# Patient Record
Sex: FEMALE | Race: WHITE | NOT HISPANIC OR LATINO | Employment: FULL TIME | URBAN - METROPOLITAN AREA
[De-identification: names, ages, dates, MRNs, and addresses within clinical notes are randomized per-mention and may not be internally consistent; named-entity substitution may affect disease eponyms.]

---

## 2017-02-05 ENCOUNTER — OFFICE VISIT (OUTPATIENT)
Dept: RETAIL CLINIC | Facility: CLINIC | Age: 22
End: 2017-02-05

## 2017-02-05 DIAGNOSIS — Z02.83 ENCOUNTER FOR DRUG SCREENING: Primary | ICD-10-CM

## 2017-02-05 PROBLEM — Z02.1 DRUG SCREENING, PRE-EMPLOYMENT: Status: ACTIVE | Noted: 2017-02-05

## 2017-11-13 ENCOUNTER — OFFICE VISIT (OUTPATIENT)
Dept: GASTROENTEROLOGY | Facility: CLINIC | Age: 22
End: 2017-11-13

## 2017-11-13 VITALS
BODY MASS INDEX: 39.38 KG/M2 | SYSTOLIC BLOOD PRESSURE: 110 MMHG | WEIGHT: 214 LBS | DIASTOLIC BLOOD PRESSURE: 80 MMHG | HEIGHT: 62 IN

## 2017-11-13 DIAGNOSIS — K21.9 GASTROESOPHAGEAL REFLUX DISEASE, ESOPHAGITIS PRESENCE NOT SPECIFIED: ICD-10-CM

## 2017-11-13 DIAGNOSIS — K58.2 IRRITABLE BOWEL SYNDROME WITH BOTH CONSTIPATION AND DIARRHEA: Primary | ICD-10-CM

## 2017-11-13 DIAGNOSIS — R11.0 NAUSEA: ICD-10-CM

## 2017-11-13 PROCEDURE — 99204 OFFICE O/P NEW MOD 45 MIN: CPT | Performed by: INTERNAL MEDICINE

## 2017-11-13 RX ORDER — VENLAFAXINE 37.5 MG/1
75 TABLET ORAL DAILY
COMMUNITY
Start: 2017-11-07 | End: 2018-11-07

## 2017-11-13 RX ORDER — SUMATRIPTAN 100 MG/1
100 TABLET, FILM COATED ORAL
COMMUNITY
Start: 2017-11-07 | End: 2018-11-07

## 2017-11-13 RX ORDER — OMEPRAZOLE 40 MG/1
40 CAPSULE, DELAYED RELEASE ORAL DAILY
COMMUNITY
End: 2019-09-17

## 2017-11-13 RX ORDER — DICYCLOMINE HYDROCHLORIDE 10 MG/1
10 CAPSULE ORAL EVERY 6 HOURS PRN
Qty: 90 CAPSULE | Refills: 1 | Status: SHIPPED | OUTPATIENT
Start: 2017-11-13 | End: 2018-10-03 | Stop reason: ALTCHOICE

## 2017-11-13 RX ORDER — RANITIDINE 150 MG/1
150 CAPSULE ORAL DAILY
COMMUNITY
End: 2019-10-09

## 2017-11-13 RX ORDER — ONDANSETRON 4 MG/1
4 TABLET, FILM COATED ORAL EVERY 12 HOURS PRN
Qty: 15 TABLET | Refills: 1 | Status: SHIPPED | OUTPATIENT
Start: 2017-11-13 | End: 2018-01-25 | Stop reason: SDUPTHER

## 2017-11-13 NOTE — PATIENT INSTRUCTIONS
Start the bentyl as needed for diarrhea.    Start citrucel tablets (or generic-- 500mg) -- 2 tablets with breakfast and dinner with 8 oz water    Zofran as needed for severe nausea    Take prilosec before breakfast and 300mg zantac at bedtime    For any additional questions, concerns or changes to your condition after today's office visit please contact the office at [phone].

## 2017-11-13 NOTE — PROGRESS NOTES
"Chief Complaint   Patient presents with   • Irritable Bowel Syndrome     gastritis, acid reflux dx at last scope 2014       Subjective     HPI    Dior Curry is a 22 y.o. female with a past medical history noted below who presents for evaluation of IBS, gastritis, and acid reflux.  Symptoms have been present for the past 7 years.     Her IBS symptoms include cycling symptoms of diarrhea, followed by more formed stools, followed by constipation. Symptoms associated with abdominal cramping. Diarrhea worse with stress. Diarrhea consists of about 4-5 urgent loose stools.  No nocturnal stools, there is no blood in her stool.  Constipation consists of hard and difficult to pass stools that take a long time to pass.  She has been on bentyl in the past and found that it helped.  She cycles between her symptoms on a weekly basis.    She gets a lot of acid reflux associated with nausea.  She feels \"a fireball\" in her stomach and will have acidic belches.  She takes omeprazole and zantac daily she takes at the same time every morning.  She feels that these medicines take the edge off but don't completely relieve her symptoms.    The nausea is a fairly consistent symptom for her.  There is no associated vomiting with it.  She says that it makes her not want to eat though she has had significant weight gain despite her symptoms.    December 2014 EGD notable for retained food-- records from [facility] Dr Iain Thrasher reviewed. She was told to eat 5 small meals daily.  He did not have any gastric emptying testing.  She did have both EGD and colonoscopy at [facility] prior to that and was diagnosed with gastritis.      She has gained weight over the few years-- she thinks about 50# since 2014.    She avoids NSAIDs.  Social ETOH, no smoking.  She works at [employer].    Mother with ulcerative colitis.  No GI malignancies in her family.      October 9, 2017 labs reviewed from [facility].  She had a normal hemoglobin, " white count and platelets.  She had normal kidney function and liver chemistry.      Past Medical History:   Diagnosis Date   • Chronic headaches    • Depression    • Gastritis    • GERD (gastroesophageal reflux disease)    • IBS (irritable bowel syndrome)    • PCOS (polycystic ovarian syndrome)          Current Outpatient Prescriptions:   •  omeprazole (priLOSEC) 40 MG capsule, Take 40 mg by mouth., Disp: , Rfl:   •  ranitidine (ZANTAC) 150 MG capsule, Take 150 mg by mouth., Disp: , Rfl:   •  SUMAtriptan (IMITREX) 100 MG tablet, Take 100 mg by mouth., Disp: , Rfl:   •  venlafaxine (EFFEXOR) 37.5 MG tablet, Take 75 mg by mouth., Disp: , Rfl:   •  dicyclomine (BENTYL) 10 MG capsule, Take 1 capsule by mouth Every 6 (Six) Hours As Needed (diarrhea)., Disp: 90 capsule, Rfl: 1  •  ondansetron (ZOFRAN) 4 MG tablet, Take 1 tablet by mouth Every 12 (Twelve) Hours As Needed for Nausea or Vomiting., Disp: 15 tablet, Rfl: 1    No Known Allergies    Social History     Social History   • Marital status: Unknown     Spouse name: N/A   • Number of children: N/A   • Years of education: N/A     Occupational History   • Not on file.     Social History Main Topics   • Smoking status: Never Smoker   • Smokeless tobacco: Not on file   • Alcohol use Yes      Comment: very seldom   • Drug use: Not on file   • Sexual activity: Not on file     Other Topics Concern   • Not on file     Social History Narrative   • No narrative on file       Family History   Problem Relation Age of Onset   • Ulcerative colitis Mother        Review of Systems   Constitutional: Positive for unexpected weight change. Negative for activity change, appetite change and fatigue.   HENT: Negative for sore throat and trouble swallowing.    Respiratory: Negative.    Cardiovascular: Negative.    Gastrointestinal: Positive for constipation, diarrhea and nausea. Negative for abdominal distention, abdominal pain and blood in stool.   Endocrine: Negative for cold intolerance  and heat intolerance.   Genitourinary: Negative for difficulty urinating, dysuria and frequency.   Musculoskeletal: Negative for arthralgias, back pain and myalgias.   Skin: Negative.    Hematological: Negative for adenopathy. Does not bruise/bleed easily.   All other systems reviewed and are negative.      Objective     Vitals:    11/13/17 1102   BP: 110/80     Last 2 weights    11/13/17  1102   Weight: 214 lb (97.1 kg)     Body mass index is 39.14 kg/(m^2).    Physical Exam   Constitutional: She is oriented to person, place, and time. She appears well-developed and well-nourished. No distress.   obese   HENT:   Head: Normocephalic and atraumatic.   Right Ear: External ear normal.   Left Ear: External ear normal.   Nose: Nose normal.   Mouth/Throat: Oropharynx is clear and moist.   Eyes: Conjunctivae and EOM are normal. Right eye exhibits no discharge. Left eye exhibits no discharge. No scleral icterus.   Neck: Normal range of motion. Neck supple. No thyromegaly present.   No supraclavicular adenopathy   Cardiovascular: Normal rate, regular rhythm, normal heart sounds and intact distal pulses.  Exam reveals no gallop.    No murmur heard.  No lower extremity edema   Pulmonary/Chest: Effort normal and breath sounds normal. No respiratory distress. She has no wheezes.   Abdominal: Soft. Normal appearance and bowel sounds are normal. She exhibits no distension and no mass. There is no hepatosplenomegaly. There is tenderness. There is no rigidity, no rebound and no guarding. No hernia.   Bilateral LQ TTP   Genitourinary:   Genitourinary Comments: Rectal exam deferred   Musculoskeletal: Normal range of motion. She exhibits no edema or tenderness.   No atrophy of upper or lower extremities.  Normal digits and nails of both hands.   Lymphadenopathy:     She has no cervical adenopathy.   Neurological: She is alert and oriented to person, place, and time. She displays no atrophy. Coordination normal.   Skin: Skin is warm and  dry. No rash noted. She is not diaphoretic. No erythema.   Psychiatric: She has a normal mood and affect. Her behavior is normal. Judgment and thought content normal.   Vitals reviewed.      No results found for: WBC, RBC, HGB, HCT, MCV, MCH, MCHC, RDW, RDWSD, MPV, PLT, NEUTRORELPCT, LYMPHORELPCT, MONORELPCT, EOSRELPCT, BASORELPCT, AUTOIGPER, NEUTROABS, LYMPHSABS, MONOSABS, EOSABS, BASOSABS, AUTOIGNUM, NRBC    No results found for: GLUCOSE, NA, K, CO2, CL, ANIONGAP, CREATININE, BUN, BCR, CALCIUM, EGFRIFNONA, ALKPHOS, PROTEINTOT, ALT, AST, BILITOT, ALBUMIN, GLOB, LABIL2      Imaging Results (last 7 days)     ** No results found for the last 168 hours. **            No notes on file    Assessment/Plan    1. IBS: Long-standing issue with alternating diarrhea and constipation.  She has had relief with Bentyl in the past.    2. Nausea: EGD 3 years ago notable for some retained solid food in her stomach.?  Gastroparesis versus dyspepsia    3. GERD: ?  Due to gastroparesis versus functional dyspepsia    Plan  -dicyclomine to use for diarrhea  -to start citrucel fiber supplementation  -omeprazole before breakfast, zantac at night  -zofran as needed for severe  Nausea  -if no improvement, will check GES  -consider repeating EGD    Doir was seen today for irritable bowel syndrome.    Diagnoses and all orders for this visit:    Irritable bowel syndrome with both constipation and diarrhea  -     dicyclomine (BENTYL) 10 MG capsule; Take 1 capsule by mouth Every 6 (Six) Hours As Needed (diarrhea).    Gastroesophageal reflux disease, esophagitis presence not specified    Nausea  -     ondansetron (ZOFRAN) 4 MG tablet; Take 1 tablet by mouth Every 12 (Twelve) Hours As Needed for Nausea or Vomiting.      I have discussed the above plan with the patient.  They verbalize understanding and are in agreement with the plan.  They have been advised to contact the office for any questions, concerns, or changes related to their  health.    Dictated utilizing Dragon dictation

## 2018-01-25 DIAGNOSIS — R11.0 NAUSEA: ICD-10-CM

## 2018-01-26 RX ORDER — ONDANSETRON 4 MG/1
TABLET, FILM COATED ORAL
Qty: 15 TABLET | Refills: 0 | Status: SHIPPED | OUTPATIENT
Start: 2018-01-26 | End: 2018-04-08 | Stop reason: SDUPTHER

## 2018-04-04 ENCOUNTER — OFFICE VISIT (OUTPATIENT)
Dept: GASTROENTEROLOGY | Facility: CLINIC | Age: 23
End: 2018-04-04

## 2018-04-04 VITALS
BODY MASS INDEX: 38.72 KG/M2 | SYSTOLIC BLOOD PRESSURE: 116 MMHG | HEIGHT: 62 IN | WEIGHT: 210.4 LBS | DIASTOLIC BLOOD PRESSURE: 68 MMHG | TEMPERATURE: 98.4 F

## 2018-04-04 DIAGNOSIS — K21.9 GASTROESOPHAGEAL REFLUX DISEASE, ESOPHAGITIS PRESENCE NOT SPECIFIED: ICD-10-CM

## 2018-04-04 DIAGNOSIS — K58.2 IRRITABLE BOWEL SYNDROME WITH BOTH CONSTIPATION AND DIARRHEA: Primary | ICD-10-CM

## 2018-04-04 PROCEDURE — 99214 OFFICE O/P EST MOD 30 MIN: CPT | Performed by: INTERNAL MEDICINE

## 2018-04-04 RX ORDER — DROSPIRENONE AND ETHINYL ESTRADIOL 0.03MG-3MG
1 KIT ORAL DAILY
COMMUNITY
Start: 2017-12-18 | End: 2019-09-17 | Stop reason: ALTCHOICE

## 2018-04-04 NOTE — PROGRESS NOTES
Chief Complaint   Patient presents with   • Irritable Bowel Syndrome     Subjective     HPI  Dior Curry is a 23 y.o. female who presents for follow up of irritable bowel with both diarrhea and constipation.  Her predominant baseline was diarrhea.  However, for the past month, she has been mostly constipated.  Having BMs about once per day.  Having urge to have a BM but only a small amount will come out and she will spend up to an hour on the toilet trying to pass stool.  She will occasionally have to digitally disimpact. She has tried miralax and and OTC pill laxatives without much relief.  Did have some diarrhea about 2 days ago but feels this was related to something she ate.  She has not needed to take the dicyclomine.    She is taking omeprazole in the morning and zantac at night with relief of GERD symptoms. No weight loss.  Otherwise she is feeling well.    Past Medical History:   Diagnosis Date   • Chronic headaches    • Depression    • Gastritis    • GERD (gastroesophageal reflux disease)    • IBS (irritable bowel syndrome)    • PCOS (polycystic ovarian syndrome)        Social History     Social History   • Marital status: Unknown     Social History Main Topics   • Smoking status: Never Smoker   • Smokeless tobacco: Never Used   • Alcohol use Yes      Comment: very seldom   • Drug use: No     Other Topics Concern   • Not on file         Current Outpatient Prescriptions:   •  dicyclomine (BENTYL) 10 MG capsule, Take 1 capsule by mouth Every 6 (Six) Hours As Needed (diarrhea)., Disp: 90 capsule, Rfl: 1  •  drospirenone-ethinyl estradiol (AVNI,OCELLA) 3-0.03 MG per tablet, Take 1 tablet by mouth., Disp: , Rfl:   •  omeprazole (priLOSEC) 40 MG capsule, Take 40 mg by mouth., Disp: , Rfl:   •  ondansetron (ZOFRAN) 4 MG tablet, TAKE ONE TABLET BY MOUTH EVERY 12 HOURS AS NEEDED FOR NAUSEA OR VOMITING, Disp: 15 tablet, Rfl: 0  •  ranitidine (ZANTAC) 150 MG capsule, Take 150 mg by mouth., Disp: , Rfl:   •   SUMAtriptan (IMITREX) 100 MG tablet, Take 100 mg by mouth., Disp: , Rfl:   •  venlafaxine (EFFEXOR) 37.5 MG tablet, Take 75 mg by mouth., Disp: , Rfl:   •  linaclotide (LINZESS) 145 MCG capsule capsule, Take 1 capsule by mouth Every Morning Before Breakfast., Disp: 30 capsule, Rfl: 1    Review of Systems   Constitutional: Negative for activity change, appetite change, chills and fever.   HENT: Negative for trouble swallowing.    Respiratory: Negative.    Cardiovascular: Negative.  Negative for chest pain.   Gastrointestinal: Positive for constipation. Negative for abdominal distention, abdominal pain, anal bleeding, diarrhea, nausea and vomiting.   Genitourinary: Negative for dysuria, frequency and hematuria.       Objective   Vitals:    04/04/18 1439   BP: 116/68   Temp: 98.4 °F (36.9 °C)     1    04/04/18  1439   Weight: 95.4 kg (210 lb 6.4 oz)     Body mass index is 38.48 kg/m².      Physical Exam   Constitutional: She is oriented to person, place, and time. She appears well-developed and well-nourished. No distress.   HENT:   Head: Normocephalic and atraumatic.   Right Ear: External ear normal.   Left Ear: External ear normal.   Nose: Nose normal.   Mouth/Throat: Oropharynx is clear and moist.   Eyes: Conjunctivae and EOM are normal. Right eye exhibits no discharge. Left eye exhibits no discharge. No scleral icterus.   Neck: Normal range of motion. Neck supple. No thyromegaly present.   No supraclavicular adenopathy   Cardiovascular: Normal rate, regular rhythm, normal heart sounds and intact distal pulses.  Exam reveals no gallop.    No murmur heard.  No lower extremity edema   Pulmonary/Chest: Effort normal and breath sounds normal. No respiratory distress. She has no wheezes.   Abdominal: Soft. Normal appearance and bowel sounds are normal. She exhibits no distension and no mass. There is no hepatosplenomegaly. There is no tenderness. There is no rigidity, no rebound and no guarding. No hernia.    Genitourinary:   Genitourinary Comments: Rectal exam deferred   Musculoskeletal: Normal range of motion. She exhibits no edema or tenderness.   No atrophy of upper or lower extremities.  Normal digits and nails of both hands.   Lymphadenopathy:     She has no cervical adenopathy.   Neurological: She is alert and oriented to person, place, and time. She displays no atrophy. Coordination normal.   Skin: Skin is warm and dry. No rash noted. She is not diaphoretic. No erythema.   Psychiatric: She has a normal mood and affect. Her behavior is normal. Judgment and thought content normal.   Vitals reviewed.      No results found for: WBC, RBC, HGB, HCT, MCV, MCH, MCHC, RDW, RDWSD, MPV, PLT, NEUTRORELPCT, LYMPHORELPCT, MONORELPCT, EOSRELPCT, BASORELPCT, AUTOIGPER, NEUTROABS, LYMPHSABS, MONOSABS, EOSABS, BASOSABS, AUTOIGNUM, NRBC    No results found for: GLUCOSE, BUN, CREATININE, EGFRIFNONA, EGFRIFAFRI, BCR, CO2, CALCIUM, PROTENTOTREF, ALBUMIN, LABIL2, AST, ALT      Imaging Results (last 7 days)     ** No results found for the last 168 hours. **            Assessment/Plan    1. IBS with constipation and diarrhea: now more issues with constipation    2. GERD: stable on ppi and H2 blocker    Plan  -start linzess--discussed that dose may need to be adjusted  -continue ppi and H2 blocker for now  -would like to get her symptoms stabilized  -no indication for endoscopy at this time  -needs weight loss-- needs weight under 200#    Dior was seen today for irritable bowel syndrome.    Diagnoses and all orders for this visit:    Irritable bowel syndrome with both constipation and diarrhea  -     linaclotide (LINZESS) 145 MCG capsule capsule; Take 1 capsule by mouth Every Morning Before Breakfast.    Gastroesophageal reflux disease, esophagitis presence not specified        Dictated utilizing Dragon dictation

## 2018-04-04 NOTE — PATIENT INSTRUCTIONS
Start the linzess for constipation.  We can adjust the dose as needed    Continue the fiber supplementation    For any additional questions, concerns or changes to your condition after today's office visit please contact the office at [phone].

## 2018-04-05 ENCOUNTER — DOCUMENTATION (OUTPATIENT)
Dept: GASTROENTEROLOGY | Facility: CLINIC | Age: 23
End: 2018-04-05

## 2018-04-08 DIAGNOSIS — R11.0 NAUSEA: ICD-10-CM

## 2018-04-10 RX ORDER — ONDANSETRON 4 MG/1
TABLET, FILM COATED ORAL
Qty: 15 TABLET | Refills: 0 | Status: SHIPPED | OUTPATIENT
Start: 2018-04-10 | End: 2018-05-25 | Stop reason: SDUPTHER

## 2018-05-25 DIAGNOSIS — R11.0 NAUSEA: ICD-10-CM

## 2018-05-25 RX ORDER — ONDANSETRON 4 MG/1
TABLET, FILM COATED ORAL
Qty: 15 TABLET | Refills: 0 | Status: SHIPPED | OUTPATIENT
Start: 2018-05-25 | End: 2019-09-17

## 2018-07-09 DIAGNOSIS — R11.0 NAUSEA: ICD-10-CM

## 2018-07-10 RX ORDER — ONDANSETRON 4 MG/1
TABLET, FILM COATED ORAL
Qty: 15 TABLET | Refills: 0 | Status: SHIPPED | OUTPATIENT
Start: 2018-07-10 | End: 2018-08-13 | Stop reason: SDUPTHER

## 2018-07-11 NOTE — TELEPHONE ENCOUNTER
Refill ×1.  If she persists with needing the Zofran will need further evaluation as to why she has persistent nausea

## 2018-08-13 DIAGNOSIS — R11.0 NAUSEA: ICD-10-CM

## 2018-08-17 RX ORDER — ONDANSETRON 4 MG/1
TABLET, FILM COATED ORAL
Qty: 15 TABLET | Refills: 0 | Status: SHIPPED | OUTPATIENT
Start: 2018-08-17 | End: 2019-09-17

## 2018-10-03 ENCOUNTER — OFFICE VISIT (OUTPATIENT)
Dept: GASTROENTEROLOGY | Facility: CLINIC | Age: 23
End: 2018-10-03

## 2018-10-03 VITALS
TEMPERATURE: 98.4 F | DIASTOLIC BLOOD PRESSURE: 72 MMHG | SYSTOLIC BLOOD PRESSURE: 124 MMHG | WEIGHT: 218.4 LBS | BODY MASS INDEX: 40.19 KG/M2 | HEIGHT: 62 IN

## 2018-10-03 DIAGNOSIS — Z83.79 FAMILY HISTORY OF ULCERATIVE COLITIS: ICD-10-CM

## 2018-10-03 DIAGNOSIS — K58.2 IRRITABLE BOWEL SYNDROME WITH BOTH CONSTIPATION AND DIARRHEA: Primary | ICD-10-CM

## 2018-10-03 DIAGNOSIS — R10.9 ABDOMINAL CRAMPING: ICD-10-CM

## 2018-10-03 PROCEDURE — 99214 OFFICE O/P EST MOD 30 MIN: CPT | Performed by: INTERNAL MEDICINE

## 2018-10-03 RX ORDER — AMITRIPTYLINE HYDROCHLORIDE 10 MG/1
10 TABLET, FILM COATED ORAL 2 TIMES DAILY
COMMUNITY
End: 2019-09-17 | Stop reason: ALTCHOICE

## 2018-10-03 RX ORDER — SODIUM CHLORIDE, SODIUM LACTATE, POTASSIUM CHLORIDE, CALCIUM CHLORIDE 600; 310; 30; 20 MG/100ML; MG/100ML; MG/100ML; MG/100ML
30 INJECTION, SOLUTION INTRAVENOUS CONTINUOUS
Status: CANCELLED | OUTPATIENT
Start: 2018-11-07

## 2018-10-03 RX ORDER — HYOSCYAMINE SULFATE 0.125 MG
0.12 TABLET ORAL EVERY 6 HOURS PRN
Qty: 90 TABLET | Refills: 1 | Status: SHIPPED | OUTPATIENT
Start: 2018-10-03 | End: 2019-11-20 | Stop reason: SDUPTHER

## 2018-10-03 NOTE — PATIENT INSTRUCTIONS
Schedule colonoscopy    Stop dicyclomine, start levsin    Daily fiber supplementation-- citrucel or similar twice a day    For any additional questions, concerns or changes to your condition after today's office visit please contact the office at [phone].

## 2018-10-03 NOTE — PROGRESS NOTES
"Chief Complaint   Patient presents with   • Irritable Bowel Syndrome     Subjective     HPI  Dior Curry is a 23 y.o. female who presents for follow up of irritable bowel with both diarrhea and constipation and GERD symptoms.    Last visit she was having constipation.  She went on linzess. She took this about a week and then she was fine.  Shortly thereafter returned to having diarrhea.    Diarrhea occurring daily.  She is averaging 3-10 BMs per day.  There is severe abdominal cramping occurring about 3 tiimes per week.  The bentyl \"takes the edge\" off the cramps but takes a while to work.  She does have lot of mucus.  Rare blood in her stool.      No weight loss.    Acid reflux controlled with prilosec.    October 9, 2017 labs reviewed from [facility].  She had a normal hemoglobin, white count and platelets.  She had normal kidney function and liver chemistry.      Mother with UC    Past Medical History:   Diagnosis Date   • Chronic headaches    • Depression    • Gastritis    • GERD (gastroesophageal reflux disease)    • IBS (irritable bowel syndrome)    • Migraines    • PCOS (polycystic ovarian syndrome)        Social History     Social History   • Marital status: Unknown     Social History Main Topics   • Smoking status: Never Smoker   • Smokeless tobacco: Never Used   • Alcohol use Yes      Comment: very seldom   • Drug use: No     Other Topics Concern   • Not on file         Current Outpatient Prescriptions:   •  amitriptyline (ELAVIL) 10 MG tablet, Take 10 mg by mouth 2 (Two) Times a Day., Disp: , Rfl:   •  drospirenone-ethinyl estradiol (AVNI,OCELLA) 3-0.03 MG per tablet, Take 1 tablet by mouth., Disp: , Rfl:   •  omeprazole (priLOSEC) 40 MG capsule, Take 40 mg by mouth., Disp: , Rfl:   •  ondansetron (ZOFRAN) 4 MG tablet, TAKE ONE TABLET BY MOUTH EVERY 12 HOURS AS NEEDED FOR NAUSEA AND VOMITING, Disp: 15 tablet, Rfl: 0  •  ondansetron (ZOFRAN) 4 MG tablet, TAKE ONE TABLET BY MOUTH EVERY 12 HOURS " AS NEEDED FOR NAUSEA AND VOMITING, Disp: 15 tablet, Rfl: 0  •  ranitidine (ZANTAC) 150 MG capsule, Take 150 mg by mouth., Disp: , Rfl:   •  venlafaxine (EFFEXOR) 37.5 MG tablet, Take 75 mg by mouth., Disp: , Rfl:   •  hyoscyamine (ANASPAZ,LEVSIN) 0.125 MG tablet, Take 1 tablet by mouth Every 6 (Six) Hours As Needed for Cramping or Diarrhea., Disp: 90 tablet, Rfl: 1  •  linaclotide (LINZESS) 145 MCG capsule capsule, Take 1 capsule by mouth Every Morning Before Breakfast., Disp: 30 capsule, Rfl: 1  •  methylcellulose, Laxative, (CITRUCEL) 500 MG tablet tablet, Take 2 tablets by mouth 2 (Two) Times a Day With Meals., Disp: 120 tablet, Rfl: 1  •  SUMAtriptan (IMITREX) 100 MG tablet, Take 100 mg by mouth., Disp: , Rfl:     Review of Systems   Constitutional: Negative for activity change, appetite change, chills and fever.   HENT: Negative for trouble swallowing.    Respiratory: Negative.    Cardiovascular: Negative.  Negative for chest pain.   Gastrointestinal: Positive for constipation and diarrhea. Negative for abdominal distention, abdominal pain, anal bleeding, nausea and vomiting.        +cramps,GERD   Genitourinary: Negative for dysuria, frequency and hematuria.       Objective   Vitals:    10/03/18 1318   BP: 124/72   Temp: 98.4 °F (36.9 °C)     1    10/03/18  1318   Weight: 99.1 kg (218 lb 6.4 oz)     Body mass index is 39.95 kg/m².      Physical Exam   Constitutional: She is oriented to person, place, and time. She appears well-developed and well-nourished. No distress.   HENT:   Head: Normocephalic and atraumatic.   Right Ear: External ear normal.   Left Ear: External ear normal.   Nose: Nose normal.   Mouth/Throat: Oropharynx is clear and moist.   Eyes: Conjunctivae and EOM are normal. Right eye exhibits no discharge. Left eye exhibits no discharge. No scleral icterus.   Neck: Normal range of motion. Neck supple. No thyromegaly present.   No supraclavicular adenopathy   Cardiovascular: Normal rate, regular  "rhythm, normal heart sounds and intact distal pulses.  Exam reveals no gallop.    No murmur heard.  No lower extremity edema   Pulmonary/Chest: Effort normal and breath sounds normal. No respiratory distress. She has no wheezes.   Abdominal: Soft. Normal appearance and bowel sounds are normal. She exhibits no distension and no mass. There is no hepatosplenomegaly. There is no tenderness. There is no rigidity, no rebound and no guarding. No hernia.   Genitourinary:   Genitourinary Comments: Rectal exam deferred   Musculoskeletal: Normal range of motion. She exhibits no edema or tenderness.   No atrophy of upper or lower extremities.  Normal digits and nails of both hands.   Lymphadenopathy:     She has no cervical adenopathy.   Neurological: She is alert and oriented to person, place, and time. She displays no atrophy. Coordination normal.   Skin: Skin is warm and dry. No rash noted. She is not diaphoretic. No erythema.   Psychiatric: She has a normal mood and affect. Her behavior is normal. Judgment and thought content normal.   Vitals reviewed.      No results found for: WBC, RBC, HGB, HCT, MCV, MCH, MCHC, RDW, RDWSD, MPV, PLT, NEUTRORELPCT, LYMPHORELPCT, MONORELPCT, EOSRELPCT, BASORELPCT, AUTOIGPER, NEUTROABS, LYMPHSABS, MONOSABS, EOSABS, BASOSABS, AUTOIGNUM, NRBC    No results found for: GLUCOSE, BUN, CREATININE, EGFRIFNONA, EGFRIFAFRI, BCR, CO2, CALCIUM, PROTENTOTREF, ALBUMIN, LABIL2, AST, ALT      Imaging Results (last 7 days)     ** No results found for the last 168 hours. **            Assessment/Plan    1. IBS with constipation and diarrhea: back to diarrhea, cramping.  Reports a prior history of \"inflammation\" on her colon when she was a teenager.  Rare blood in her stool.  Concerning with family hx of UC    2. Abdominal cramping: with above, dicyclomine not completely helping    3. Family hx of UC: in her mother    4. GERD: stable on ppi and H2 blocker    Plan  Colonoscopy for further evaluation of her " symptoms.  I think at this point we need to rule out any inflammatory bowel disease given with perseverance of her symptoms and family history    Stop dicyclomine, start levsin    Daily fiber supplementation-- citrucel or similar twice a day      Dior was seen today for irritable bowel syndrome.    Diagnoses and all orders for this visit:    Irritable bowel syndrome with both constipation and diarrhea  -     Case Request; Standing  -     Follow Anesthesia Guidelines / Standing Orders; Future  -     Implement Anesthesia Orders Day of Procedure; Standing  -     Obtain Informed Consent; Standing  -     Verify bowel prep was successful; Standing  -     lactated ringers infusion; Infuse 30 mL/hr into a venous catheter Continuous.  -     Case Request  -     hyoscyamine (ANASPAZ,LEVSIN) 0.125 MG tablet; Take 1 tablet by mouth Every 6 (Six) Hours As Needed for Cramping or Diarrhea.  -     methylcellulose, Laxative, (CITRUCEL) 500 MG tablet tablet; Take 2 tablets by mouth 2 (Two) Times a Day With Meals.    Abdominal cramping  -     Case Request; Standing  -     Follow Anesthesia Guidelines / Standing Orders; Future  -     Implement Anesthesia Orders Day of Procedure; Standing  -     Obtain Informed Consent; Standing  -     Verify bowel prep was successful; Standing  -     lactated ringers infusion; Infuse 30 mL/hr into a venous catheter Continuous.  -     Case Request  -     hyoscyamine (ANASPAZ,LEVSIN) 0.125 MG tablet; Take 1 tablet by mouth Every 6 (Six) Hours As Needed for Cramping or Diarrhea.    Family history of ulcerative colitis  -     Case Request; Standing  -     Follow Anesthesia Guidelines / Standing Orders; Future  -     Implement Anesthesia Orders Day of Procedure; Standing  -     Obtain Informed Consent; Standing  -     Verify bowel prep was successful; Standing  -     lactated ringers infusion; Infuse 30 mL/hr into a venous catheter Continuous.  -     Case Request        Dictated utilizing Dragon  dictation

## 2018-11-07 ENCOUNTER — ANESTHESIA EVENT (OUTPATIENT)
Dept: GASTROENTEROLOGY | Facility: HOSPITAL | Age: 23
End: 2018-11-07

## 2018-11-07 ENCOUNTER — HOSPITAL ENCOUNTER (OUTPATIENT)
Facility: HOSPITAL | Age: 23
Setting detail: HOSPITAL OUTPATIENT SURGERY
Discharge: HOME | End: 2018-11-07
Attending: INTERNAL MEDICINE | Admitting: INTERNAL MEDICINE

## 2018-11-07 ENCOUNTER — ANESTHESIA (OUTPATIENT)
Dept: GASTROENTEROLOGY | Facility: HOSPITAL | Age: 23
End: 2018-11-07

## 2018-11-07 VITALS
DIASTOLIC BLOOD PRESSURE: 88 MMHG | HEART RATE: 75 BPM | WEIGHT: 216.5 LBS | BODY MASS INDEX: 39.84 KG/M2 | TEMPERATURE: 98.2 F | SYSTOLIC BLOOD PRESSURE: 124 MMHG | HEIGHT: 62 IN | RESPIRATION RATE: 16 BRPM | OXYGEN SATURATION: 99 %

## 2018-11-07 DIAGNOSIS — K58.2 IRRITABLE BOWEL SYNDROME WITH BOTH CONSTIPATION AND DIARRHEA: ICD-10-CM

## 2018-11-07 DIAGNOSIS — R10.9 ABDOMINAL CRAMPING: ICD-10-CM

## 2018-11-07 DIAGNOSIS — Z83.79 FAMILY HISTORY OF ULCERATIVE COLITIS: ICD-10-CM

## 2018-11-07 LAB
B-HCG UR QL: NEGATIVE
INTERNAL NEGATIVE CONTROL: NEGATIVE
INTERNAL POSITIVE CONTROL: POSITIVE
Lab: NORMAL

## 2018-11-07 PROCEDURE — 88305 TISSUE EXAM BY PATHOLOGIST: CPT | Performed by: INTERNAL MEDICINE

## 2018-11-07 PROCEDURE — 81025 URINE PREGNANCY TEST: CPT | Performed by: INTERNAL MEDICINE

## 2018-11-07 PROCEDURE — 25010000002 PROPOFOL 10 MG/ML EMULSION: Performed by: ANESTHESIOLOGY

## 2018-11-07 PROCEDURE — S0260 H&P FOR SURGERY: HCPCS | Performed by: INTERNAL MEDICINE

## 2018-11-07 PROCEDURE — 45380 COLONOSCOPY AND BIOPSY: CPT | Performed by: INTERNAL MEDICINE

## 2018-11-07 RX ORDER — PROPOFOL 10 MG/ML
VIAL (ML) INTRAVENOUS CONTINUOUS PRN
Status: DISCONTINUED | OUTPATIENT
Start: 2018-11-07 | End: 2018-11-07 | Stop reason: SURG

## 2018-11-07 RX ORDER — PROPOFOL 10 MG/ML
VIAL (ML) INTRAVENOUS AS NEEDED
Status: DISCONTINUED | OUTPATIENT
Start: 2018-11-07 | End: 2018-11-07 | Stop reason: SURG

## 2018-11-07 RX ORDER — LIDOCAINE HYDROCHLORIDE 20 MG/ML
INJECTION, SOLUTION INFILTRATION; PERINEURAL AS NEEDED
Status: DISCONTINUED | OUTPATIENT
Start: 2018-11-07 | End: 2018-11-07 | Stop reason: SURG

## 2018-11-07 RX ORDER — SODIUM CHLORIDE, SODIUM LACTATE, POTASSIUM CHLORIDE, CALCIUM CHLORIDE 600; 310; 30; 20 MG/100ML; MG/100ML; MG/100ML; MG/100ML
30 INJECTION, SOLUTION INTRAVENOUS CONTINUOUS
Status: DISCONTINUED | OUTPATIENT
Start: 2018-11-07 | End: 2018-11-07 | Stop reason: HOSPADM

## 2018-11-07 RX ORDER — NORTRIPTYLINE HYDROCHLORIDE 10 MG/1
10 CAPSULE ORAL NIGHTLY
COMMUNITY
End: 2020-08-27

## 2018-11-07 RX ADMIN — SODIUM CHLORIDE, POTASSIUM CHLORIDE, SODIUM LACTATE AND CALCIUM CHLORIDE: 600; 310; 30; 20 INJECTION, SOLUTION INTRAVENOUS at 08:41

## 2018-11-07 RX ADMIN — SODIUM CHLORIDE, POTASSIUM CHLORIDE, SODIUM LACTATE AND CALCIUM CHLORIDE 30 ML/HR: 600; 310; 30; 20 INJECTION, SOLUTION INTRAVENOUS at 08:28

## 2018-11-07 RX ADMIN — LIDOCAINE HYDROCHLORIDE 40 MG: 20 INJECTION, SOLUTION INFILTRATION; PERINEURAL at 08:42

## 2018-11-07 RX ADMIN — PROPOFOL 120 MG: 10 INJECTION, EMULSION INTRAVENOUS at 08:43

## 2018-11-07 RX ADMIN — PROPOFOL 140 MCG/KG/MIN: 10 INJECTION, EMULSION INTRAVENOUS at 08:44

## 2018-11-07 NOTE — ANESTHESIA POSTPROCEDURE EVALUATION
"Patient: Dior Curry    Procedure Summary     Date:  11/07/18 Room / Location:  [facility] ENDOSCOPY 4 /  [facility]    Anesthesia Start:  0840 Anesthesia Stop:  0908    Procedure:  COLONOSCOPY INTO CECUM AND TI WITH BIOPSIES (N/A ) Diagnosis:       Abdominal cramping      Family history of ulcerative colitis      Irritable bowel syndrome with both constipation and diarrhea      (Abdominal cramping [R10.9])      (Family history of ulcerative colitis [Z83.79])      (Irritable bowel syndrome with both constipation and diarrhea [K58.2])    Surgeon:  Jeni Curry MD Provider:  Nile Echevarria MD    Anesthesia Type:  MAC ASA Status:  2          Anesthesia Type: MAC  Last vitals  BP   124/88 (11/07/18 0928)   Temp   36.8 °C (98.2 °F) (11/07/18 0803)   Pulse   75 (11/07/18 0928)   Resp   16 (11/07/18 0928)     SpO2   99 % (11/07/18 0928)     Post Anesthesia Care and Evaluation    Patient location during evaluation: bedside  Patient participation: complete - patient participated  Level of consciousness: awake and alert  Pain management: adequate  Anesthetic complications: No anesthetic complications    Cardiovascular status: acceptable  Respiratory status: acceptable  Hydration status: acceptable    Comments: /88 (BP Location: Left arm, Patient Position: Sitting)   Pulse 75   Temp 36.8 °C (98.2 °F) (Oral)   Resp 16   Ht 157.5 cm (62\")   Wt 98.2 kg (216 lb 8 oz)   LMP 10/09/2018   SpO2 99%   BMI 39.60 kg/m²         "

## 2018-11-07 NOTE — ANESTHESIA PREPROCEDURE EVALUATION
Anesthesia Evaluation     NPO Solid Status: > 8 hours  NPO Liquid Status: > 8 hours           Airway   Mallampati: II  Dental      Pulmonary    Cardiovascular - normal exam        Neuro/Psych  GI/Hepatic/Renal/Endo    (+) obesity,  GERD,      Musculoskeletal     Abdominal    Substance History      OB/GYN          Other                        Anesthesia Plan    ASA 2     MAC     intravenous induction   Anesthetic plan, all risks, benefits, and alternatives have been provided, discussed and informed consent has been obtained with: patient.

## 2018-11-07 NOTE — H&P
"[facility]  Pre Procedure History & Physical    Chief Complaint: diarrhea, family history of ulcerative colitis      HPI: 23 y.o. female who presents for follow up of irritable bowel with both diarrhea and constipation and GERD symptoms.     Last visit she was having constipation.  She went on linzess. She took this about a week and then she was fine.  Shortly thereafter returned to having diarrhea.     Diarrhea occurring daily.  She is averaging 3-10 BMs per day.  There is severe abdominal cramping occurring about 3 tiimes per week.  The bentyl \"takes the edge\" off the cramps but takes a while to work.  She does have lot of mucus.  Rare blood in her stool.       No weight loss.     Acid reflux controlled with prilosec.     October 9, 2017 labs reviewed from [facility].  She had a normal hemoglobin, white count and platelets.  She had normal kidney function and liver chemistry.       Mother with UC    Past Medical History:   Past Medical History:   Diagnosis Date   • Chronic headaches    • Depression    • Gastritis    • GERD (gastroesophageal reflux disease)    • IBS (irritable bowel syndrome)    • Migraines    • PCOS (polycystic ovarian syndrome)        Family History:  Family History   Problem Relation Age of Onset   • Ulcerative colitis Mother    • Malig Hyperthermia Neg Hx        Social History:   reports that she has never smoked. She has never used smokeless tobacco. She reports that she drinks alcohol. She reports that she does not use drugs.    Medications:   Prescriptions Prior to Admission   Medication Sig Dispense Refill Last Dose   • drospirenone-ethinyl estradiol (AVNI,OCELLA) 3-0.03 MG per tablet Take 1 tablet by mouth Daily.   Taking   • hyoscyamine (ANASPAZ,LEVSIN) 0.125 MG tablet Take 1 tablet by mouth Every 6 (Six) Hours As Needed for Cramping or Diarrhea. 90 tablet 1    • linaclotide (LINZESS) 145 MCG capsule capsule Take 1 capsule by mouth Every Morning Before " Breakfast. (Patient taking differently: Take 145 mcg by mouth As Needed.) 30 capsule 1 Not Taking   • methylcellulose, Laxative, (CITRUCEL) 500 MG tablet tablet Take 2 tablets by mouth 2 (Two) Times a Day With Meals. 120 tablet 1    • omeprazole (priLOSEC) 40 MG capsule Take 40 mg by mouth Daily.   Taking   • ondansetron (ZOFRAN) 4 MG tablet TAKE ONE TABLET BY MOUTH EVERY 12 HOURS AS NEEDED FOR NAUSEA AND VOMITING 15 tablet 0 Taking   • ranitidine (ZANTAC) 150 MG capsule Take 150 mg by mouth Daily.   Taking   • SUMAtriptan (IMITREX) 100 MG tablet Take 100 mg by mouth.   Not Taking   • venlafaxine (EFFEXOR) 37.5 MG tablet Take 75 mg by mouth Daily.   Taking   • amitriptyline (ELAVIL) 10 MG tablet Take 10 mg by mouth 2 (Two) Times a Day.   Taking   • ondansetron (ZOFRAN) 4 MG tablet TAKE ONE TABLET BY MOUTH EVERY 12 HOURS AS NEEDED FOR NAUSEA AND VOMITING 15 tablet 0 Taking       Allergies:  Patient has no known allergies.    ROS:    Pertinent items are noted in HPI     Objective     Last menstrual period 10/09/2018.    Physical Exam   Constitutional: Pt is oriented to person, place, and time and well-developed, well-nourished, and in no distress.   HENT:   Mouth/Throat: Oropharynx is clear and moist.   Neck: Normal range of motion. Neck supple.   Cardiovascular: Normal rate, regular rhythm and normal heart sounds.    Pulmonary/Chest: Effort normal and breath sounds normal. No respiratory distress. No  wheezes.   Abdominal: Soft. Bowel sounds are normal.   Skin: Skin is warm and dry.   Psychiatric: Mood, memory, affect and judgment normal.     Assessment/Plan     Diagnosis: diarrhea, family history of ulcerative colitis      Anticipated Surgical Procedure:    Colonoscopy    The risks, benefits, and alternatives of this procedure have been discussed with the patient or the responsible party- the patient understands and agrees to proceed.

## 2018-11-08 ENCOUNTER — TELEPHONE (OUTPATIENT)
Dept: GASTROENTEROLOGY | Facility: CLINIC | Age: 23
End: 2018-11-08

## 2018-11-08 LAB
CYTO UR: NORMAL
LAB AP CASE REPORT: NORMAL
PATH REPORT.FINAL DX SPEC: NORMAL
PATH REPORT.GROSS SPEC: NORMAL

## 2018-11-08 NOTE — TELEPHONE ENCOUNTER
"Called pt and advised per Dr Curry that the bx from her c/s were normal without evidence of any kind of colitis.      She recommends to continue current medications to treat her irritable bowel and f/u as needed.     Pt verb understanding .  Pt is asking if the the \"small polyps\" she saw on the pictures of her scope report are ok to to not be removed.  Advised would send message to Dr Curry.   "

## 2018-11-08 NOTE — PROGRESS NOTES
The biopsies from her colonoscopy were normal without evidence of any kind of colitis.    Continue her current medications to treat her irritable bowel syndrome.  Follow up as needed

## 2018-11-08 NOTE — TELEPHONE ENCOUNTER
----- Message from Jeni Curry MD sent at 11/8/2018 12:57 PM EST -----  The biopsies from her colonoscopy were normal without evidence of any kind of colitis.    Continue her current medications to treat her irritable bowel syndrome.  Follow up as needed

## 2018-11-09 NOTE — TELEPHONE ENCOUNTER
There were no polyps in her colon.  I suspect she is looking at the terminal ileum picture, and the bumpy appearance is normal and NOT polyps.

## 2018-11-09 NOTE — TELEPHONE ENCOUNTER
Called pt and advised per Dr Curry that there were no polyps in her colon.  She suspects she is looking at the terminal ileum picture, and the bumpy appearance is normal and not polyps. Pt verb understanding.

## 2019-09-17 ENCOUNTER — OFFICE VISIT (OUTPATIENT)
Dept: GASTROENTEROLOGY | Facility: CLINIC | Age: 24
End: 2019-09-17

## 2019-09-17 VITALS — HEIGHT: 62 IN | BODY MASS INDEX: 41.81 KG/M2 | WEIGHT: 227.2 LBS | TEMPERATURE: 98.9 F

## 2019-09-17 DIAGNOSIS — K58.2 IRRITABLE BOWEL SYNDROME WITH BOTH CONSTIPATION AND DIARRHEA: Primary | ICD-10-CM

## 2019-09-17 DIAGNOSIS — K21.9 GASTROESOPHAGEAL REFLUX DISEASE, ESOPHAGITIS PRESENCE NOT SPECIFIED: ICD-10-CM

## 2019-09-17 DIAGNOSIS — R10.30 LOWER ABDOMINAL PAIN: ICD-10-CM

## 2019-09-17 DIAGNOSIS — G43.A0 CYCLICAL VOMITING WITH NAUSEA, INTRACTABILITY OF VOMITING NOT SPECIFIED: ICD-10-CM

## 2019-09-17 PROCEDURE — 99214 OFFICE O/P EST MOD 30 MIN: CPT | Performed by: NURSE PRACTITIONER

## 2019-09-17 RX ORDER — VENLAFAXINE 37.5 MG/1
75 TABLET ORAL
COMMUNITY
Start: 2019-04-29 | End: 2020-04-28

## 2019-09-17 RX ORDER — PROMETHAZINE HYDROCHLORIDE 12.5 MG/1
12.5 TABLET ORAL EVERY 6 HOURS PRN
Qty: 20 TABLET | Refills: 2 | Status: SHIPPED | OUTPATIENT
Start: 2019-09-17 | End: 2020-05-12 | Stop reason: SDUPTHER

## 2019-09-17 RX ORDER — DESOGESTREL AND ETHINYL ESTRADIOL 21-5 (28)
1 KIT ORAL DAILY
COMMUNITY
Start: 2019-07-16 | End: 2020-08-27 | Stop reason: ALTCHOICE

## 2019-09-17 RX ORDER — ALBUTEROL SULFATE 90 UG/1
2 AEROSOL, METERED RESPIRATORY (INHALATION)
COMMUNITY
Start: 2018-12-12 | End: 2019-12-12

## 2019-09-17 RX ORDER — NORTRIPTYLINE HYDROCHLORIDE 10 MG/1
CAPSULE ORAL
COMMUNITY
End: 2019-09-17 | Stop reason: SDUPTHER

## 2019-09-17 NOTE — PROGRESS NOTES
Chief Complaint   Patient presents with   • Diarrhea   • Vomiting   • Constipation   • Nausea   • Heartburn       Dior Curry is a  24 y.o. female here for a follow up visit for nausea and vomiting.    HPI  24-year-old female presents today for follow-up visit for nausea and vomiting, IBS-mixed and GERD.  She is a patient of Dr. Curry.  She was last seen in the office on 10/3/2018.  She is a history of IBS-mixed and admits for the past 2 weeks she has been miserable at first having lots of episodes of daily diarrhea with gas, bloating and lower abdominal pain.  Levsin helps a little bit with the cramping but not much.  Then she admits this past weekend the diarrhea stopped completely and then she had some rectal bleeding when wiping after passing a very large hard stool this past Sunday.  She has had no bowel movement since.  She is had no rectal bleeding since.  With her history of GERD she has been taking 2 omeprazole 40 mg every morning and 2 ranitidine 150 mg at night.  This usually holds her well but she admits for the past 2 weeks her reflux has been worse and has been so severe is even woke her up at night with nausea and vomiting and feeling food coming up in her throat.  She has felt so bad that she went to the [facility] clinic and was given some Zofran.  She admits that both Zofran has not helped her at all.  Does me she felt so bad that she missed 2 days of work last week.  She does have a family history of ulcerative colitis and therefore underwent a screening colonoscopy last November which showed some granular mucosa in the terminal ileum with some nonbleeding internal hemorrhoids but otherwise was completely unremarkable.  Last EGD was done in 2015.  She denies any dysphagia, diarrhea, rectal bleeding or melena.  She admits her appetite is okay and her weight is increased from 216 pounds this past November to 227 pounds today.    Past Medical History:   Diagnosis Date   • Chronic headaches    •  Depression    • Gastritis    • GERD (gastroesophageal reflux disease)    • IBS (irritable bowel syndrome)    • Migraines    • PCOS (polycystic ovarian syndrome)        Past Surgical History:   Procedure Laterality Date   • COLONOSCOPY  approx 2010    inflammation per patient   • COLONOSCOPY N/A 11/7/2018    Granular mucosa in TI , NBIH   • UPPER GASTROINTESTINAL ENDOSCOPY  approx 2015    slow transit per patient   • WISDOM TOOTH EXTRACTION         Scheduled Meds:    Continuous Infusions:  No current facility-administered medications for this visit.     PRN Meds:.    No Known Allergies    Social History     Socioeconomic History   • Marital status: Unknown     Spouse name: Not on file   • Number of children: Not on file   • Years of education: Not on file   • Highest education level: Not on file   Tobacco Use   • Smoking status: Never Smoker   • Smokeless tobacco: Never Used   Substance and Sexual Activity   • Alcohol use: Yes     Comment: very seldom   • Drug use: No       Family History   Problem Relation Age of Onset   • Ulcerative colitis Mother    • Malig Hyperthermia Neg Hx        Review of Systems   Constitutional: Negative for appetite change, chills, diaphoresis, fatigue, fever and unexpected weight change.   HENT: Negative for nosebleeds, postnasal drip, sore throat, trouble swallowing and voice change.    Respiratory: Negative for cough, choking, chest tightness, shortness of breath and wheezing.    Cardiovascular: Negative for chest pain, palpitations and leg swelling.   Gastrointestinal: Positive for abdominal distention, constipation and nausea. Negative for abdominal pain, anal bleeding, blood in stool, diarrhea, rectal pain and vomiting.   Endocrine: Negative for polydipsia, polyphagia and polyuria.   Musculoskeletal: Negative for gait problem.   Skin: Negative for rash and wound.   Allergic/Immunologic: Negative for food allergies.   Neurological: Negative for dizziness, speech difficulty and  light-headedness.   Psychiatric/Behavioral: Negative for confusion, self-injury, sleep disturbance and suicidal ideas.       Vitals:    09/17/19 1431   Temp: 98.9 °F (37.2 °C)       Physical Exam   Constitutional: She is oriented to person, place, and time. She appears well-developed and well-nourished. She does not appear ill. No distress.   HENT:   Head: Normocephalic.   Eyes: Pupils are equal, round, and reactive to light.   Cardiovascular: Normal rate, regular rhythm and normal heart sounds.   Pulmonary/Chest: Effort normal and breath sounds normal.   Abdominal: Soft. Bowel sounds are normal. She exhibits distension. She exhibits no mass. There is no hepatosplenomegaly. There is no tenderness. There is no rebound and no guarding. No hernia.   Musculoskeletal: Normal range of motion.   Neurological: She is alert and oriented to person, place, and time.   Skin: Skin is warm and dry.   Psychiatric: She has a normal mood and affect. Her speech is normal and behavior is normal. Judgment normal.       No images are attached to the encounter.    Assessment and plan    1. Irritable bowel syndrome with both constipation and diarrhea    2. Lower abdominal pain    3. Gastroesophageal reflux disease, esophagitis presence not specified    4. Cyclical vomiting with nausea, intractability of vomiting not specified    IBS-mixed is not well controlled at this time.  We will have her resume her Linzess that she has at home.  Will give her some Phenergan 12.5 mg every 6 as needed for nausea since Zofran is not working.  GERD is not well controlled at all.  Will stop the omeprazole and give her samples of Dexilant 60 mg to be taken 1 tab daily in a.m. and she can continue the 2 ranitidine at night.  Continue GERD precautions. Continue a bland diet.  Patient to call the office next week with an update.  Advised the patient if her symptoms were to get worse for her to go to the [facility] ER for immediate evaluation.  Patient to  follow-up in the office with me in 2 to 3 weeks.

## 2019-09-25 ENCOUNTER — TELEPHONE (OUTPATIENT)
Dept: GASTROENTEROLOGY | Facility: CLINIC | Age: 24
End: 2019-09-25

## 2019-09-25 RX ORDER — DEXLANSOPRAZOLE 60 MG/1
60 CAPSULE, DELAYED RELEASE ORAL DAILY
Qty: 30 CAPSULE | Refills: 5 | Status: SHIPPED | OUTPATIENT
Start: 2019-09-25 | End: 2019-10-25

## 2019-09-25 NOTE — TELEPHONE ENCOUNTER
Called pt and advised per Shanita NP that we have refilled the dexilant 60mg po daily to her [facility].    Pt verb understandng/     Please see other call dated today regarding bm status.

## 2019-09-25 NOTE — TELEPHONE ENCOUNTER
----- Message from Israel Corley sent at 9/25/2019  9:05 AM EDT -----  Regarding: meds  Contact: [phone]  Pt said she was given samples of Dexilant and it is working and would like to have a prescription of it called in, her pharmacy is [facility] on [address] she did not have the number. She also said that she is constipated and has tried everything Shanita told her to do but it has not helped would like to know what else she can do.

## 2019-09-25 NOTE — TELEPHONE ENCOUNTER
"Called pt and pt reports that she is currently taking 3 stool softeners per day and the linzess ( pt does not know dose).  She is having a \"sm bm every day, but she feels like she has to go more\".  She reports a lot pressure in her stomach and is having a sharp pain in her left groin.  She denies a fever and chills. Pt is asking what can she do or take.   Advised pt will send message to Dr Curry.  Pt verb understanding.   "

## 2019-09-25 NOTE — TELEPHONE ENCOUNTER
She is to get 2 bottles of mag citrate.  She can take 1 and see if she has a good bowel movement with this, if not, she can repeat.  She should expect watery stool due to the mag citrate however

## 2019-09-25 NOTE — TELEPHONE ENCOUNTER
----- Message from Mimi Elaine sent at 9/25/2019 12:26 PM EDT -----  Regarding: [employer]  PT STATES CONSTIPATION, STOMACH ISSUES. WOULD LIKE CALL FROM CLINICAL TEAM.

## 2019-09-25 NOTE — TELEPHONE ENCOUNTER
Okay to give refill on Dexilant 60 mg once daily.  Go ahead and give her enough for 6 months.  What mg of Linzess has she been taking at home?  We need to either increase it or change it altogether.

## 2019-09-27 ENCOUNTER — PRIOR AUTHORIZATION (OUTPATIENT)
Dept: GASTROENTEROLOGY | Facility: CLINIC | Age: 24
End: 2019-09-27

## 2019-09-27 NOTE — TELEPHONE ENCOUNTER
PA submitted through [facility] for Dexilant 60MG.  Denied immediately- not a covered drug on patient's insurance- non formulary

## 2019-09-27 NOTE — TELEPHONE ENCOUNTER
Would change it to Prevacid 30 mg twice daily and see how she does.  Have the patient call back in a week or 2 with an update.

## 2019-09-30 RX ORDER — LANSOPRAZOLE 30 MG/1
30 CAPSULE, DELAYED RELEASE ORAL 2 TIMES DAILY
Qty: 60 CAPSULE | Refills: 2 | Status: SHIPPED | OUTPATIENT
Start: 2019-09-30 | End: 2020-05-19

## 2019-09-30 NOTE — TELEPHONE ENCOUNTER
Called pt and left vm for pt to call back.     Preacid 30mg po bid was escribed to pt's [facility] pharmacy.

## 2019-10-09 ENCOUNTER — TELEPHONE (OUTPATIENT)
Dept: GASTROENTEROLOGY | Facility: CLINIC | Age: 24
End: 2019-10-09

## 2019-10-09 ENCOUNTER — OFFICE VISIT (OUTPATIENT)
Dept: GASTROENTEROLOGY | Facility: CLINIC | Age: 24
End: 2019-10-09

## 2019-10-09 VITALS
BODY MASS INDEX: 41.56 KG/M2 | WEIGHT: 225.85 LBS | HEIGHT: 62 IN | SYSTOLIC BLOOD PRESSURE: 118 MMHG | TEMPERATURE: 98.4 F | DIASTOLIC BLOOD PRESSURE: 66 MMHG

## 2019-10-09 DIAGNOSIS — K21.9 GASTROESOPHAGEAL REFLUX DISEASE, ESOPHAGITIS PRESENCE NOT SPECIFIED: ICD-10-CM

## 2019-10-09 DIAGNOSIS — K58.2 IRRITABLE BOWEL SYNDROME WITH BOTH CONSTIPATION AND DIARRHEA: Primary | ICD-10-CM

## 2019-10-09 DIAGNOSIS — R10.30 LOWER ABDOMINAL PAIN: ICD-10-CM

## 2019-10-09 PROCEDURE — 99214 OFFICE O/P EST MOD 30 MIN: CPT | Performed by: NURSE PRACTITIONER

## 2019-10-09 NOTE — PROGRESS NOTES
Chief Complaint   Patient presents with   • Irritable Bowel Syndrome       Dior Curry is a  24 y.o. female here for a follow up visit for IBS.    HPI  4-year-old female presents today for follow-up visit for GERD and IBS-mixed.  She is a patient of Dr. Curry.  She was last seen in the office on 9/17/2019.  She was having issues with severe constipation ended up in the ER after trying several different medications finally got relief with an enema but unfortunately since having the enema done she has now loose stools.  She has started on daily fiber and admits this has slowed down quite a bit but it is not 100%.  She continues to have occasional abdominal cramping but she admits Levsin helps with this.  She also has a history of GERD and admits she is doing great on Dexilant 60 mill grams once daily unfortunately her insurance will not approve this without a prior authorization.  She denies any dysphagia, abdominal pain, nausea and vomiting, reflux, rectal bleeding or melena.  She admits her appetite is good and her weight is stable.    Past Medical History:   Diagnosis Date   • Chronic headaches    • Depression    • Gastritis    • GERD (gastroesophageal reflux disease)    • IBS (irritable bowel syndrome)    • Migraines    • PCOS (polycystic ovarian syndrome)        Past Surgical History:   Procedure Laterality Date   • COLONOSCOPY  approx 2010    inflammation per patient   • COLONOSCOPY N/A 11/7/2018    Granular mucosa in TI , NBIH   • UPPER GASTROINTESTINAL ENDOSCOPY  approx 2015    slow transit per patient   • WISDOM TOOTH EXTRACTION         Scheduled Meds:    Continuous Infusions:  No current facility-administered medications for this visit.     PRN Meds:.    No Known Allergies    Social History     Socioeconomic History   • Marital status: Unknown     Spouse name: Not on file   • Number of children: Not on file   • Years of education: Not on file   • Highest education level: Not on file   Tobacco Use   •  Smoking status: Never Smoker   • Smokeless tobacco: Never Used   Substance and Sexual Activity   • Alcohol use: Yes     Comment: very seldom   • Drug use: No       Family History   Problem Relation Age of Onset   • Ulcerative colitis Mother    • Malig Hyperthermia Neg Hx        Review of Systems   Constitutional: Negative for appetite change, chills, diaphoresis, fatigue, fever and unexpected weight change.   HENT: Negative for nosebleeds, postnasal drip, sore throat, trouble swallowing and voice change.    Respiratory: Negative for cough, choking, chest tightness, shortness of breath and wheezing.    Cardiovascular: Negative for chest pain, palpitations and leg swelling.   Gastrointestinal: Positive for abdominal distention and diarrhea. Negative for abdominal pain, anal bleeding, blood in stool, constipation, nausea, rectal pain and vomiting.   Endocrine: Negative for polydipsia, polyphagia and polyuria.   Musculoskeletal: Negative for gait problem.   Skin: Negative for rash and wound.   Allergic/Immunologic: Negative for food allergies.   Neurological: Negative for dizziness, speech difficulty and light-headedness.   Psychiatric/Behavioral: Negative for confusion, self-injury, sleep disturbance and suicidal ideas.       Vitals:    10/09/19 1409   BP: 118/66   Temp: 98.4 °F (36.9 °C)       Physical Exam   Constitutional: She is oriented to person, place, and time. She appears well-developed and well-nourished. She does not appear ill. No distress.   HENT:   Head: Normocephalic.   Eyes: Pupils are equal, round, and reactive to light.   Cardiovascular: Normal rate, regular rhythm and normal heart sounds.   Pulmonary/Chest: Effort normal and breath sounds normal.   Abdominal: Soft. Bowel sounds are normal. She exhibits distension. She exhibits no mass. There is no hepatosplenomegaly. There is no tenderness. There is no rebound and no guarding. No hernia.   Musculoskeletal: Normal range of motion.   Neurological: She  is alert and oriented to person, place, and time.   Skin: Skin is warm and dry.   Psychiatric: She has a normal mood and affect. Her speech is normal and behavior is normal. Judgment normal.       No images are attached to the encounter.    Assessment and plan    1. Irritable bowel syndrome with both constipation and diarrhea    2. Lower abdominal pain    3. Gastroesophageal reflux disease, esophagitis presence not specified    GERD is well controlled on Dexilant 60 mg once daily.  Unfortunately her insurance will not cover it without a prior authorization.  Will give her some more samples today to try.  IBS-mixed seems not well controlled at this time.  Since patient went to the ER ended up getting enemas she is now left with very loose stools.  The once daily fiber is somewhat helping.  Recommend she increase the fiber to 2 a day and see how that goes.  She can continue the Levsin for abdominal pain and cramping as needed.  Patient to call back next week with an update.  Patient to follow-up with me in 3 to 4 weeks.

## 2019-10-09 NOTE — TELEPHONE ENCOUNTER
Okay she will have to stick with the Prevacid 30 mg twice daily for now.  Eventually I guess the patient will call back once she is ran out of her Dexilant samples.  And we can tell her then. thanks

## 2019-10-09 NOTE — TELEPHONE ENCOUNTER
----- Message from EDGARDO Vo sent at 10/9/2019  2:33 PM EDT -----  Patient tells me today that her insurance told her they were waiting on a PA for the dexilant? Can you check on this please. Thanks.

## 2019-10-09 NOTE — TELEPHONE ENCOUNTER
Per call on 09/27/2019 dexilant was denied , not on formulary.  Pepcid 30mg po bid was sent to her pharmacy and multiple attempts to notify pt of this were unsuccessful.      Called pt today and left vm for her to call back.     Update sent to Shanita OLVERA

## 2019-11-01 ENCOUNTER — TELEPHONE (OUTPATIENT)
Dept: GASTROENTEROLOGY | Facility: CLINIC | Age: 24
End: 2019-11-01

## 2019-11-01 NOTE — TELEPHONE ENCOUNTER
"Regarding: Prescription Question  Contact: [phone]  ----- Message from Urlist, Generic sent at 10/31/2019 11:47 PM EDT -----    I've been contacting my pharmacy since my appointment with you on 9/17 about getting my Dexilant filled. My pharmacist keeps telling me that they've sent multiple requests to \"the doctor's office\" with no response. They are waiting on prior authorization before I can receive the Dexilant. I spoke with you about this at my last appointment on 10/9. I've been waiting on this for a month & a half now and I'm really suffering. My stomach has been killing me. I have an upcoming appointment with you on 11/8 and I hope we can get this resolved.  "

## 2019-11-04 NOTE — TELEPHONE ENCOUNTER
Called pt and left vm for pt to call back    Sent message to pt thru mychart advising dexilant is not covered by her insurance and Shanita has prescribed pepcid 30mg po bid.  Advised to call with questions.

## 2019-11-20 ENCOUNTER — OFFICE VISIT (OUTPATIENT)
Dept: GASTROENTEROLOGY | Facility: CLINIC | Age: 24
End: 2019-11-20

## 2019-11-20 VITALS
HEIGHT: 62 IN | SYSTOLIC BLOOD PRESSURE: 132 MMHG | BODY MASS INDEX: 41.31 KG/M2 | DIASTOLIC BLOOD PRESSURE: 90 MMHG | TEMPERATURE: 98.7 F

## 2019-11-20 DIAGNOSIS — K21.9 GASTROESOPHAGEAL REFLUX DISEASE, ESOPHAGITIS PRESENCE NOT SPECIFIED: ICD-10-CM

## 2019-11-20 DIAGNOSIS — R10.9 ABDOMINAL CRAMPING: ICD-10-CM

## 2019-11-20 DIAGNOSIS — K58.2 IRRITABLE BOWEL SYNDROME WITH BOTH CONSTIPATION AND DIARRHEA: Primary | ICD-10-CM

## 2019-11-20 PROCEDURE — 99214 OFFICE O/P EST MOD 30 MIN: CPT | Performed by: NURSE PRACTITIONER

## 2019-11-20 RX ORDER — HYOSCYAMINE SULFATE 0.125 MG
0.12 TABLET ORAL EVERY 6 HOURS PRN
Qty: 90 TABLET | Refills: 3 | Status: SHIPPED | OUTPATIENT
Start: 2019-11-20 | End: 2022-12-29 | Stop reason: SDUPTHER

## 2019-11-20 RX ORDER — FAMOTIDINE 20 MG/1
20 TABLET, FILM COATED ORAL 2 TIMES DAILY
Qty: 60 TABLET | Refills: 11 | Status: SHIPPED | OUTPATIENT
Start: 2019-11-20 | End: 2020-05-19 | Stop reason: SDUPTHER

## 2019-11-20 NOTE — PROGRESS NOTES
Chief Complaint   Patient presents with   • Irritable Bowel Syndrome       Dior Curry is a  24 y.o. female here for a follow up visit for GERD.    HPI  24-year-old female presents today for follow-up visit for GERD and IBS-mixed.  She is a patient of Dr. Curry.  She was last seen in the office on 10/9/2019.  She is happy to report that since doubling up on her fiber she has definitely improved her bowel habits.  She has recently started a new job and she does think stress is played a role in some intermittent episodes with diarrhea lately but otherwise she is feeling really good.  She does have a history of GERD and admits she is not doing so great on just the Prevacid twice a day.  She was supposed to start the Pepcid but there was a mixup in her pharmacies.  So she has not started it yet.  She continues to have breakthrough reflux that seems to be worse at night.  She denies any dysphagia, abdominal pain, nausea and vomiting, diarrhea, constipation, rectal bleeding or melena.  She was her appetite is good and her weight is stable.  Her last colonoscopy was done on 11/2018.  She will be due again in 10 years.  Past Medical History:   Diagnosis Date   • Chronic headaches    • Depression    • Gastritis    • GERD (gastroesophageal reflux disease)    • IBS (irritable bowel syndrome)    • Migraines    • PCOS (polycystic ovarian syndrome)        Past Surgical History:   Procedure Laterality Date   • COLONOSCOPY  approx 2010    inflammation per patient   • COLONOSCOPY N/A 11/7/2018    Granular mucosa in TI , NBIH   • UPPER GASTROINTESTINAL ENDOSCOPY  approx 2015    slow transit per patient   • WISDOM TOOTH EXTRACTION         Scheduled Meds:    Continuous Infusions:  No current facility-administered medications for this visit.     PRN Meds:.    No Known Allergies    Social History     Socioeconomic History   • Marital status: Significant Other     Spouse name: Not on file   • Number of children: Not on file   • Years  of education: Not on file   • Highest education level: Not on file   Tobacco Use   • Smoking status: Never Smoker   • Smokeless tobacco: Never Used   Substance and Sexual Activity   • Alcohol use: Yes     Comment: very seldom   • Drug use: No       Family History   Problem Relation Age of Onset   • Ulcerative colitis Mother    • Malig Hyperthermia Neg Hx        Review of Systems   Constitutional: Negative for appetite change, chills, diaphoresis, fatigue, fever and unexpected weight change.   HENT: Negative for nosebleeds, postnasal drip, sore throat, trouble swallowing and voice change.    Respiratory: Negative for cough, choking, chest tightness, shortness of breath and wheezing.    Cardiovascular: Negative for chest pain, palpitations and leg swelling.   Gastrointestinal: Positive for abdominal distention. Negative for abdominal pain, anal bleeding, blood in stool, constipation, diarrhea, nausea, rectal pain and vomiting.   Endocrine: Negative for polydipsia, polyphagia and polyuria.   Musculoskeletal: Negative for gait problem.   Skin: Negative for rash and wound.   Allergic/Immunologic: Negative for food allergies.   Neurological: Negative for dizziness, speech difficulty and light-headedness.   Psychiatric/Behavioral: Negative for confusion, self-injury, sleep disturbance and suicidal ideas.       Vitals:    11/20/19 0919   BP: 132/90   Temp: 98.7 °F (37.1 °C)       Physical Exam   Constitutional: She is oriented to person, place, and time. She appears well-developed and well-nourished. She does not appear ill. No distress.   HENT:   Head: Normocephalic.   Eyes: Pupils are equal, round, and reactive to light.   Cardiovascular: Normal rate, regular rhythm and normal heart sounds.   Pulmonary/Chest: Effort normal and breath sounds normal.   Abdominal: Soft. Bowel sounds are normal. She exhibits no distension and no mass. There is no hepatosplenomegaly. There is no tenderness. There is no rebound and no guarding.  No hernia.   Musculoskeletal: Normal range of motion.   Neurological: She is alert and oriented to person, place, and time.   Skin: Skin is warm and dry.   Psychiatric: She has a normal mood and affect. Her speech is normal and behavior is normal. Judgment normal.       No images are attached to the encounter.    Assessment and plan     1. Irritable bowel syndrome with both constipation and diarrhea  - hyoscyamine (ANASPAZ,LEVSIN) 0.125 MG tablet; Take 1 tablet by mouth Every 6 (Six) Hours As Needed for Cramping or Diarrhea.  Dispense: 90 tablet; Refill: 3    2. Abdominal cramping  - hyoscyamine (ANASPAZ,LEVSIN) 0.125 MG tablet; Take 1 tablet by mouth Every 6 (Six) Hours As Needed for Cramping or Diarrhea.  Dispense: 90 tablet; Refill: 3    3. Gastroesophageal reflux disease, esophagitis presence not specified    IBS-mixed seems to be well-controlled at this time.  Continue Levsin as needed.  Continue daily fiber.  GERD is not well controlled at this time.  Continue Prevacid but will add some Pepcid.  Continue GERD precautions.  Patient to call the office next week with an update.  Patient to follow-up with me in 3 months.

## 2020-05-08 NOTE — TELEPHONE ENCOUNTER
[facility] request received for phenergan 12.5 mg - 1 tab by mouth every 6 hours as needed for N&V    Pt last seen 11/20/19 -cancelled 2 f/u appts, no show for third.  Message to ALLI Lara.

## 2020-05-11 RX ORDER — PROMETHAZINE HYDROCHLORIDE 12.5 MG/1
12.5 TABLET ORAL EVERY 6 HOURS PRN
Qty: 20 TABLET | Refills: 2 | OUTPATIENT
Start: 2020-05-11

## 2020-05-11 NOTE — TELEPHONE ENCOUNTER
Patient needs appt . M Marco, APRN.     **VM to pt with request to contact office.  Ana Martinez RN.

## 2020-05-12 RX ORDER — PROMETHAZINE HYDROCHLORIDE 12.5 MG/1
12.5 TABLET ORAL EVERY 6 HOURS PRN
Qty: 20 TABLET | Refills: 2 | Status: SHIPPED | OUTPATIENT
Start: 2020-05-12 | End: 2020-05-19 | Stop reason: SDUPTHER

## 2020-05-12 NOTE — TELEPHONE ENCOUNTER
Pt called and made f/u appt for 05/19 at 1115a with Shanita OLVERA. Pt does report having more nausea and is asking if she can have refill on phenergan to hold her over until her appt. Advised will send message to Shanita OLVERA.

## 2020-05-19 ENCOUNTER — OFFICE VISIT (OUTPATIENT)
Dept: GASTROENTEROLOGY | Facility: CLINIC | Age: 25
End: 2020-05-19

## 2020-05-19 ENCOUNTER — PRIOR AUTHORIZATION (OUTPATIENT)
Dept: GASTROENTEROLOGY | Facility: CLINIC | Age: 25
End: 2020-05-19

## 2020-05-19 VITALS — TEMPERATURE: 97.5 F | WEIGHT: 220 LBS | BODY MASS INDEX: 40.48 KG/M2 | HEIGHT: 62 IN

## 2020-05-19 DIAGNOSIS — R10.9 ABDOMINAL CRAMPING: ICD-10-CM

## 2020-05-19 DIAGNOSIS — K21.9 GASTROESOPHAGEAL REFLUX DISEASE, ESOPHAGITIS PRESENCE NOT SPECIFIED: Primary | ICD-10-CM

## 2020-05-19 DIAGNOSIS — K58.2 IRRITABLE BOWEL SYNDROME WITH BOTH CONSTIPATION AND DIARRHEA: ICD-10-CM

## 2020-05-19 PROCEDURE — 99214 OFFICE O/P EST MOD 30 MIN: CPT | Performed by: NURSE PRACTITIONER

## 2020-05-19 RX ORDER — LANSOPRAZOLE 30 MG/1
30 CAPSULE, DELAYED RELEASE ORAL 2 TIMES DAILY
Qty: 60 CAPSULE | Refills: 2 | OUTPATIENT
Start: 2020-05-19 | End: 2021-06-16

## 2020-05-19 RX ORDER — FAMOTIDINE 20 MG/1
20 TABLET, FILM COATED ORAL 2 TIMES DAILY
Qty: 60 TABLET | Refills: 11 | Status: SHIPPED | OUTPATIENT
Start: 2020-05-19 | End: 2021-10-19

## 2020-05-19 RX ORDER — PROMETHAZINE HYDROCHLORIDE 12.5 MG/1
12.5 TABLET ORAL EVERY 6 HOURS PRN
Qty: 20 TABLET | Refills: 2 | Status: SHIPPED | OUTPATIENT
Start: 2020-05-19 | End: 2021-07-20

## 2020-05-19 NOTE — TELEPHONE ENCOUNTER
PA submitted through [facility] for Famotidine 20MG tablets.  Immediate response: The drug requested is not covered on the formulary for this member

## 2020-05-19 NOTE — PROGRESS NOTES
Chief Complaint   Patient presents with   • Irritable Bowel Syndrome   • Heartburn       Dior Curry is a  25 y.o. female here for a follow up visit for GERD.    HPI  25-year-old female presents today for follow-up visit for GERD and IBS-mixed.  She is a patient of Dr. Curry.  She was last seen in the office on 11/20/2019.  She has a history of GERD and admits she is still not doing great on Prevacid 30 mg Pepcid 20 mg twice daily.  She does me she has been taking the Prevacid to 30 mg tablets every morning and then taken the Pepcid at night.  She still having crazy breakthrough reflux at night where she is having to eat a lot of Tums.  She also has been having a lot of nausea at night so she been taking Phenergan.  She does tell me she is under a lot of stress right now with everything going on.  She also has a history of IBS-mixed and admits her bowels are moving pretty well on fiber.  She is been taking daily fiber since her last visit here and admits that does seem to be helping.  She admits that she is not drinking enough water and she is not exercising as much as she used to.  She definitely thinks these things are contributing to her constipation issues.  Last colonoscopy was in 2018.  Last EGD was 2015.  She also uses LEVSIN as needed for abdominal pain and cramping and this seems to work pretty well to.  She maybe uses it a couple times a week.  She denies any dysphagia, vomiting, rectal bleeding or melena.  She was appetite is good and her weight is stable.  Past Medical History:   Diagnosis Date   • Chronic headaches    • Depression    • Gastritis    • GERD (gastroesophageal reflux disease)    • IBS (irritable bowel syndrome)    • Migraines    • PCOS (polycystic ovarian syndrome)        Past Surgical History:   Procedure Laterality Date   • COLONOSCOPY  approx 2010    inflammation per patient   • COLONOSCOPY N/A 11/7/2018    Granular mucosa in TI , NBIH   • UPPER GASTROINTESTINAL ENDOSCOPY  approx  2015    slow transit per patient   • WISDOM TOOTH EXTRACTION         Scheduled Meds:    Continuous Infusions:  No current facility-administered medications for this visit.     PRN Meds:.    No Known Allergies    Social History     Socioeconomic History   • Marital status: Significant Other     Spouse name: Not on file   • Number of children: Not on file   • Years of education: Not on file   • Highest education level: Not on file   Tobacco Use   • Smoking status: Never Smoker   • Smokeless tobacco: Never Used   Substance and Sexual Activity   • Alcohol use: Yes     Comment: very seldom   • Drug use: No       Family History   Problem Relation Age of Onset   • Ulcerative colitis Mother    • Malig Hyperthermia Neg Hx        Review of Systems   Constitutional: Negative for appetite change, chills, diaphoresis, fatigue, fever and unexpected weight change.   HENT: Negative for nosebleeds, postnasal drip, sore throat, trouble swallowing and voice change.    Respiratory: Negative for cough, choking, chest tightness, shortness of breath and wheezing.    Cardiovascular: Negative for chest pain, palpitations and leg swelling.   Gastrointestinal: Positive for abdominal distention, constipation and nausea. Negative for abdominal pain, anal bleeding, blood in stool, diarrhea, rectal pain and vomiting.   Endocrine: Negative for polydipsia, polyphagia and polyuria.   Musculoskeletal: Negative for gait problem.   Skin: Negative for rash and wound.   Allergic/Immunologic: Negative for food allergies.   Neurological: Negative for dizziness, speech difficulty and light-headedness.   Psychiatric/Behavioral: Negative for confusion, self-injury, sleep disturbance and suicidal ideas.       Vitals:    05/19/20 1118   Temp: 97.5 °F (36.4 °C)       Physical Exam   Constitutional: She is oriented to person, place, and time. She appears well-developed and well-nourished. She does not appear ill. No distress.   HENT:   Head: Normocephalic.    Eyes: Pupils are equal, round, and reactive to light.   Cardiovascular: Normal rate, regular rhythm and normal heart sounds.   Pulmonary/Chest: Effort normal and breath sounds normal.   Abdominal: Soft. Bowel sounds are normal. She exhibits distension. She exhibits no mass. There is no hepatosplenomegaly. There is no tenderness. There is no rebound and no guarding. No hernia.   Musculoskeletal: Normal range of motion.   Neurological: She is alert and oriented to person, place, and time.   Skin: Skin is warm and dry.   Psychiatric: She has a normal mood and affect. Her speech is normal and behavior is normal. Judgment normal.       No radiology results for the last 7 days     Assessment and plan     1. Gastroesophageal reflux disease, esophagitis presence not specified    2. Irritable bowel syndrome with both constipation and diarrhea    3. Abdominal cramping    GERD is still not well controlled.  I want her to take the Prevacid 30 mg twice a day not all at the same time and I want her to space out the Pepcid as well.  I will go ahead and refill both today.  She needs to continue GERD precautions.  She is to increase her water and exercise as tolerated.  She is to continue daily fiber supplementation.  I will also go ahead and refill the Phenergan for as needed use.  Patient to call the office next week with an update.  Patient to follow-up with me in 1 month.

## 2020-08-27 ENCOUNTER — OFFICE VISIT (OUTPATIENT)
Dept: INTERNAL MEDICINE | Facility: CLINIC | Age: 25
End: 2020-08-27

## 2020-08-27 VITALS
WEIGHT: 231 LBS | RESPIRATION RATE: 16 BRPM | SYSTOLIC BLOOD PRESSURE: 127 MMHG | DIASTOLIC BLOOD PRESSURE: 69 MMHG | OXYGEN SATURATION: 98 % | BODY MASS INDEX: 42.51 KG/M2 | HEART RATE: 88 BPM | TEMPERATURE: 99.3 F | HEIGHT: 62 IN

## 2020-08-27 DIAGNOSIS — Z76.89 ENCOUNTER TO ESTABLISH CARE: Primary | ICD-10-CM

## 2020-08-27 DIAGNOSIS — F32.A ANXIETY AND DEPRESSION: ICD-10-CM

## 2020-08-27 DIAGNOSIS — J45.40 MODERATE PERSISTENT ASTHMA WITHOUT COMPLICATION: ICD-10-CM

## 2020-08-27 DIAGNOSIS — Z82.61 FH: RHEUMATOID ARTHRITIS: ICD-10-CM

## 2020-08-27 DIAGNOSIS — E28.2 PCOS (POLYCYSTIC OVARIAN SYNDROME): ICD-10-CM

## 2020-08-27 DIAGNOSIS — Z13.6 SCREENING FOR HYPERTENSION: ICD-10-CM

## 2020-08-27 DIAGNOSIS — K58.2 IRRITABLE BOWEL SYNDROME WITH BOTH CONSTIPATION AND DIARRHEA: ICD-10-CM

## 2020-08-27 DIAGNOSIS — Z13.220 ENCOUNTER FOR LIPID SCREENING FOR CARDIOVASCULAR DISEASE: ICD-10-CM

## 2020-08-27 DIAGNOSIS — M25.50 ARTHRALGIA, UNSPECIFIED JOINT: ICD-10-CM

## 2020-08-27 DIAGNOSIS — Z13.6 ENCOUNTER FOR LIPID SCREENING FOR CARDIOVASCULAR DISEASE: ICD-10-CM

## 2020-08-27 DIAGNOSIS — M79.10 MYALGIA: ICD-10-CM

## 2020-08-27 DIAGNOSIS — Z00.00 HEALTHCARE MAINTENANCE: ICD-10-CM

## 2020-08-27 DIAGNOSIS — Z86.69 HISTORY OF MIGRAINE: ICD-10-CM

## 2020-08-27 DIAGNOSIS — F41.9 ANXIETY AND DEPRESSION: ICD-10-CM

## 2020-08-27 DIAGNOSIS — Z82.69 FAMILY HISTORY OF FIBROMYALGIA: ICD-10-CM

## 2020-08-27 PROBLEM — E16.1 REACTIVE HYPOGLYCEMIA: Status: ACTIVE | Noted: 2020-08-27

## 2020-08-27 PROBLEM — K21.9 GASTROESOPHAGEAL REFLUX DISEASE WITHOUT ESOPHAGITIS: Status: ACTIVE | Noted: 2020-08-27

## 2020-08-27 PROCEDURE — 99203 OFFICE O/P NEW LOW 30 MIN: CPT | Performed by: NURSE PRACTITIONER

## 2020-08-27 RX ORDER — EPINEPHRINE NASAL SOLUTION 1 MG/ML
0.5 SOLUTION NASAL AS NEEDED
COMMUNITY
End: 2021-06-16

## 2020-08-27 RX ORDER — DROSPIRENONE AND ETHINYL ESTRADIOL 0.03MG-3MG
KIT ORAL
COMMUNITY
Start: 2020-08-10 | End: 2021-06-16

## 2020-08-27 RX ORDER — MONTELUKAST SODIUM 10 MG/1
10 TABLET ORAL DAILY
COMMUNITY
Start: 2018-12-12

## 2020-08-27 RX ORDER — NORTRIPTYLINE HYDROCHLORIDE 10 MG/1
10 CAPSULE ORAL NIGHTLY
Qty: 90 CAPSULE | Refills: 0 | Status: SHIPPED | OUTPATIENT
Start: 2020-08-27 | End: 2021-02-01

## 2020-08-27 RX ORDER — FLUTICASONE PROPIONATE 50 MCG
2 SPRAY, SUSPENSION (ML) NASAL DAILY
COMMUNITY
Start: 2020-01-13

## 2020-08-27 RX ORDER — CETIRIZINE HYDROCHLORIDE 10 MG/1
10 TABLET ORAL DAILY
COMMUNITY

## 2020-08-27 RX ORDER — ALBUTEROL SULFATE 90 UG/1
2 AEROSOL, METERED RESPIRATORY (INHALATION)
COMMUNITY
Start: 2018-12-12

## 2020-08-27 NOTE — PROGRESS NOTES
Subjective   Dior Curry is a 25 y.o. female.   Chief Complaint   Patient presents with   • Establish Care     Pt presents here today to establish care.   • Depression       Patient presents to establish care.  This is a 25-year-old female.  Previous PCP was Dr. Hu.  This patient is new to me.    She has depression and anxiety, both of which she has struggled with since her teenage years.  Reports that she has tried multiple antidepressants in the past, all of which caused significant side effects.  Currently she is exercising daily, listening to motivational podcasts and journaling which seems to have controlled her depressive and anxious symptoms.  She has seen psychiatry in the past and would like a new referral.  She has seen therapists in the past and reports that this was very helpful and in the future she may like to be referred again for therapy.  Denies SI or HI.    She has severe allergies and sees Dr. Ruelas, allergist routinely.  She takes Zyrtec, Flonase, Singulair and gets allergy injections twice weekly.    She has asthma and takes albuterol as needed.  Denies any recent exacerbations.  She does not require albuterol often.    Over the past 3 months she has noticed gradually worsening myalgias throughout the body as well as joint pain in the bilateral ankles, wrists, elbows.  She denies any mechanism of injury to any of the joints.  She feels that her fingers, hands and ankles are slightly swollen.  Denies erythema or heat in any joints.  Reports that she has never been tested for any rheumatologic issues but her mother has both fibromyalgia and rheumatoid arthritis.  The pain in her joints and muscles is gradually worsening over the past few months.    She has a history of chronic migraines and has followed with neurology, Dr. Huy Alcala in the past.  She reports that he has retired and she would like a referral for a new neurologist.  She was taking nortriptyline 10 mg nightly which was  controlling the migraines well.  She has been out of this medication since her neurologist retired and would like a refill if possible because her migraines have come back, 2/week.    She has PCOS and follows with GYN, EDGARDO Bauer.  She takes birth control pills.    She has multiple GI issues including GERD, gastritis, IBS.  She follows with ESTEBAN Slaughter APRN routinely for these issues.    She is a social alcohol drinker, 2-3 times per month, not in excess.  Never smoker.  Endorses generally well-balanced diet.  She eats 5 small meals per day due to her chronic GI issues.  She walks 1 to 2 miles per day.    She denies development of any other new issues today.       The following portions of the patient's history were reviewed and updated as appropriate: allergies, current medications, past family history, past medical history, past social history, past surgical history and problem list.    Review of Systems   Constitutional: Negative for activity change, chills, fatigue, fever, unexpected weight gain and unexpected weight loss.   HENT: Negative for congestion, hearing loss, postnasal drip, sinus pressure, sneezing, sore throat, swollen glands and tinnitus.    Eyes: Negative for photophobia, pain and visual disturbance.   Respiratory: Negative for cough, chest tightness, shortness of breath and wheezing.    Cardiovascular: Negative for chest pain, palpitations and leg swelling.   Gastrointestinal: Negative for abdominal distention, abdominal pain, constipation, diarrhea, nausea and vomiting.   Endocrine: Negative for polydipsia, polyphagia and polyuria.   Genitourinary: Negative for dysuria, frequency, hematuria and urgency.   Musculoskeletal: Positive for arthralgias and myalgias.   Neurological: Negative for dizziness, weakness, numbness and headache.   All other systems reviewed and are negative.      Objective    /69 (BP Location: Left arm, Patient Position: Sitting, Cuff Size: Adult)   Pulse  "88   Temp 99.3 °F (37.4 °C) (Oral)   Resp 16   Ht 157.5 cm (62\")   Wt 105 kg (231 lb)   SpO2 98%   BMI 42.25 kg/m²     Physical Exam   Constitutional: She is oriented to person, place, and time. She appears well-developed and well-nourished. No distress.   HENT:   Head: Normocephalic and atraumatic.   Eyes: Pupils are equal, round, and reactive to light. EOM are normal.   Neck: Normal range of motion. Neck supple.   No carotid bruits auscultated   Cardiovascular: Normal rate, regular rhythm, normal heart sounds and intact distal pulses. Exam reveals no gallop and no friction rub.   No murmur heard.  No peripheral edema.  Posterior tib pulses 2+ and equal bilaterally.   Pulmonary/Chest: Effort normal and breath sounds normal. No stridor. No respiratory distress. She has no wheezes. She has no rales. She exhibits no tenderness.   Lungs CTA bilaterally   Abdominal: Soft. Bowel sounds are normal. She exhibits no distension. There is no tenderness.   Musculoskeletal: Normal range of motion.   Neurological: She is alert and oriented to person, place, and time.   Skin: Skin is warm and dry. Capillary refill takes less than 2 seconds. She is not diaphoretic.   Psychiatric: She has a normal mood and affect. Her behavior is normal. Judgment and thought content normal.   Nursing note and vitals reviewed.    Current outpatient and discharge medications have been reconciled for the patient.  Reviewed by: EDGARDO Stephenson      Assessment/Plan   Dior was seen today for establish care and depression.    Diagnoses and all orders for this visit:    Encounter to establish care    Healthcare maintenance  -     CBC No Differential  -     Comprehensive metabolic panel  -     Lipid panel  -     TSH Rfx On Abnormal To Free T4  -     Hepatitis C antibody    Screening for hypertension  -     CBC No Differential  -     Comprehensive metabolic panel  -     TSH Rfx On Abnormal To Free T4    Encounter for lipid screening for " cardiovascular disease  -     Lipid panel    Moderate persistent asthma without complication    History of migraine  -     Ambulatory Referral to Neurology  -     nortriptyline (PAMELOR) 10 MG capsule; Take 1 capsule by mouth Every Night.    Anxiety and depression  -     Ambulatory Referral to Psychiatry    Arthralgia, unspecified joint  -     Uric acid  -     MUNIR  -     Rheumatoid Factor, Quant    Myalgia  -     Vitamin D 25 hydroxy  -     Vitamin B12  -     Uric acid  -     MUNIR  -     Rheumatoid Factor, Quant    FH: rheumatoid arthritis  -     Uric acid  -     MUNIR  -     Rheumatoid Factor, Quant    Family history of fibromyalgia  -     Uric acid  -     MUNIR  -     Rheumatoid Factor, Quant    Irritable bowel syndrome with both constipation and diarrhea    PCOS (polycystic ovarian syndrome)      -Est care: Discussed medical history, surgical history, social history.  Provided diet and exercise recommendations.  Reviewed family history as well.    -Healthcare maintenance: One-time hep C screen today.    -Hypertension screen: Well controlled today at 127/69.  Discussed screening herself often due to family history.  Discussed lowering dietary sodium intake.    -Asthma: Stable with PRN albuterol, no recent exacerbations.    -Chronic migraines: Referral to Dr. Roberts, neurology is entered per patient request.  Her previous neurologist, Dr. Alcala has retired.  I prescribed her nortriptyline 10 mg nightly until she can get in with neurology.    -Anxiety and depression: Stable at this time with journaling and exercise.  She would like to see psychiatry due to her history of intolerance of multiple medications.  Referral to Dr. Lovell is entered.    -IBS: Following with EDGARDO Merrill DTR, GI.  Continue current therapy.  Routine follow-ups.    -PCOS: Follow with EDGARDO Bauer.  Continue birth control per the direction of GYN.    -Myalgias and arthralgias: Significant family history in first-degree relative, mother,  fibromyalgia and rheumatoid arthritis.  MUNIR today, uric acid, rheumatoid factor along with vitamin D, B12, CBC, CMP.    We will contact patient with her lab results and any further recommendations.  Follow-up PRN and I will see her back in 6 months for a physical.

## 2020-08-28 LAB
25(OH)D3+25(OH)D2 SERPL-MCNC: 29 NG/ML (ref 30–100)
ALBUMIN SERPL-MCNC: 4.5 G/DL (ref 3.5–5.2)
ALBUMIN/GLOB SERPL: 2 G/DL
ALP SERPL-CCNC: 73 U/L (ref 39–117)
ALT SERPL-CCNC: 13 U/L (ref 1–33)
ANA SER QL: NEGATIVE
AST SERPL-CCNC: 12 U/L (ref 1–32)
BILIRUB SERPL-MCNC: 0.3 MG/DL (ref 0–1.2)
BUN SERPL-MCNC: 10 MG/DL (ref 6–20)
BUN/CREAT SERPL: 13.5 (ref 7–25)
CALCIUM SERPL-MCNC: 9.2 MG/DL (ref 8.6–10.5)
CHLORIDE SERPL-SCNC: 102 MMOL/L (ref 98–107)
CHOLEST SERPL-MCNC: 186 MG/DL (ref 0–200)
CO2 SERPL-SCNC: 22.7 MMOL/L (ref 22–29)
CREAT SERPL-MCNC: 0.74 MG/DL (ref 0.57–1)
ERYTHROCYTE [DISTWIDTH] IN BLOOD BY AUTOMATED COUNT: 12.4 % (ref 12.3–15.4)
GLOBULIN SER CALC-MCNC: 2.3 GM/DL
GLUCOSE SERPL-MCNC: 102 MG/DL (ref 65–99)
HCT VFR BLD AUTO: 40.7 % (ref 34–46.6)
HCV AB S/CO SERPL IA: <0.1 S/CO RATIO (ref 0–0.9)
HDLC SERPL-MCNC: 47 MG/DL (ref 40–60)
HGB BLD-MCNC: 13.7 G/DL (ref 12–15.9)
LDLC SERPL CALC-MCNC: 101 MG/DL (ref 0–100)
MCH RBC QN AUTO: 29.8 PG (ref 26.6–33)
MCHC RBC AUTO-ENTMCNC: 33.7 G/DL (ref 31.5–35.7)
MCV RBC AUTO: 88.5 FL (ref 79–97)
PLATELET # BLD AUTO: 218 10*3/MM3 (ref 140–450)
POTASSIUM SERPL-SCNC: 4.1 MMOL/L (ref 3.5–5.2)
PROT SERPL-MCNC: 6.8 G/DL (ref 6–8.5)
RBC # BLD AUTO: 4.6 10*6/MM3 (ref 3.77–5.28)
RHEUMATOID FACT SERPL-ACNC: <10 IU/ML (ref 0–13.9)
SODIUM SERPL-SCNC: 137 MMOL/L (ref 136–145)
TRIGL SERPL-MCNC: 188 MG/DL (ref 0–150)
TSH SERPL DL<=0.005 MIU/L-ACNC: 2.32 UIU/ML (ref 0.27–4.2)
URATE SERPL-MCNC: 5.4 MG/DL (ref 2.4–5.7)
VIT B12 SERPL-MCNC: 307 PG/ML (ref 211–946)
VLDLC SERPL CALC-MCNC: 37.6 MG/DL
WBC # BLD AUTO: 9.06 10*3/MM3 (ref 3.4–10.8)

## 2020-08-31 DIAGNOSIS — E53.8 B12 DEFICIENCY: ICD-10-CM

## 2020-08-31 DIAGNOSIS — E55.9 HYPOVITAMINOSIS D: Primary | ICD-10-CM

## 2020-08-31 RX ORDER — ERGOCALCIFEROL 1.25 MG/1
50000 CAPSULE ORAL WEEKLY
Qty: 8 CAPSULE | Refills: 0 | Status: SHIPPED | OUTPATIENT
Start: 2020-08-31 | End: 2021-02-01

## 2020-08-31 NOTE — PROGRESS NOTES
Please notify patient that her blood count is stable with no anemia.  Metabolic panel stable including electrolytes, kidney, liver function.  Mildly elevated triglycerides and LDL cholesterol, please watch dietary intake of cholesterol.  Thyroid labs are normal.  Vitamin B12 is normal although on the low end of normal and I would recommend supplementing with vitamin B12 1000 mcg over-the-counter daily.  Her vitamin D is low.  This can cause muscle aches.  I ordered prescription strength vitamin D to be taken once weekly x8 weeks.  Please make her a 2-month lab appointment for recheck of both vitamin D and B12.  Her uric acid is normal.  Rheumatoid labs are normal.

## 2020-09-29 ENCOUNTER — OFFICE VISIT (OUTPATIENT)
Dept: GASTROENTEROLOGY | Facility: CLINIC | Age: 25
End: 2020-09-29

## 2020-09-29 VITALS — HEIGHT: 62 IN | BODY MASS INDEX: 42.4 KG/M2 | WEIGHT: 230.4 LBS | TEMPERATURE: 98.4 F

## 2020-09-29 DIAGNOSIS — K21.9 GASTROESOPHAGEAL REFLUX DISEASE WITHOUT ESOPHAGITIS: Primary | ICD-10-CM

## 2020-09-29 DIAGNOSIS — K58.2 IRRITABLE BOWEL SYNDROME WITH BOTH CONSTIPATION AND DIARRHEA: ICD-10-CM

## 2020-09-29 PROCEDURE — 99214 OFFICE O/P EST MOD 30 MIN: CPT | Performed by: NURSE PRACTITIONER

## 2020-09-29 NOTE — PROGRESS NOTES
Chief Complaint   Patient presents with   • Heartburn   • Irritable Bowel Syndrome       Dior Curry is a  25 y.o. female here for a follow up visit for GERD.    HPI  25-year-old female presents today for follow-up visit for GERD.  She is a patient of Dr. Curry.  She was last seen in the office on 5/19/2020.  She has a history of GERD/gastritis and admits she does really well with Prevacid 30 mg twice daily and Pepcid 40 mg twice daily.  Occasionally she will have some breakthrough reflux depending on what she eats and she will have to take Tums but she admits that is been pretty rare lately.  Overall she feels like her reflux is much better controlled.  She is currently working with her GYN to straighten out her birth control pills this has seemed to mess up her IBS somewhat.  She does have a history of IBS-mix and admits she does better when she takes 2 Levsin a day.  She denies any dysphagia, reflux, abdominal pain, nausea and vomiting, rectal bleeding or melena.  She is appetite is good and her weight has gone up 10 pounds since May of this year.  Her last colonoscopy was on 11/2018.  Her last EGD was in 2015.  Past Medical History:   Diagnosis Date   • Asthma    • Chronic headaches    • Depression    • Eczema    • Gastritis    • GERD (gastroesophageal reflux disease)    • Hemorrhoids    • History of bronchitis    • History of pneumonia    • IBS (irritable bowel syndrome)    • Migraine    • Migraines    • PCOS (polycystic ovarian syndrome)        Past Surgical History:   Procedure Laterality Date   • COLONOSCOPY  approx 2010    inflammation per patient   • COLONOSCOPY N/A 11/7/2018    Granular mucosa in TI , NBIH   • UPPER GASTROINTESTINAL ENDOSCOPY  approx 2015    slow transit per patient   • WISDOM TOOTH EXTRACTION         Scheduled Meds:    Continuous Infusions:No current facility-administered medications for this visit.       PRN Meds:.    No Known Allergies    Social History     Socioeconomic History    • Marital status: Significant Other     Spouse name: Not on file   • Number of children: Not on file   • Years of education: Not on file   • Highest education level: Not on file   Tobacco Use   • Smoking status: Never Smoker   • Smokeless tobacco: Never Used   Substance and Sexual Activity   • Alcohol use: Yes     Frequency: Monthly or less     Comment: very seldom   • Drug use: No   • Sexual activity: Yes     Birth control/protection: Pill       Family History   Problem Relation Age of Onset   • Ulcerative colitis Mother    • Fibromyalgia Mother    • Rheum arthritis Mother    • Hypertension Mother    • Diabetes type II Father    • Hypertension Father    • Malig Hyperthermia Neg Hx        Review of Systems   Constitutional: Negative for appetite change, chills, diaphoresis, fatigue, fever and unexpected weight change.   HENT: Negative for nosebleeds, postnasal drip, sore throat, trouble swallowing and voice change.    Respiratory: Negative for cough, choking, chest tightness, shortness of breath and wheezing.    Cardiovascular: Negative for chest pain, palpitations and leg swelling.   Gastrointestinal: Positive for abdominal distention. Negative for abdominal pain, anal bleeding, blood in stool, constipation, diarrhea, nausea, rectal pain and vomiting.   Endocrine: Negative for polydipsia, polyphagia and polyuria.   Musculoskeletal: Negative for gait problem.   Skin: Negative for rash and wound.   Allergic/Immunologic: Negative for food allergies.   Neurological: Negative for dizziness, speech difficulty and light-headedness.   Psychiatric/Behavioral: Negative for confusion, self-injury, sleep disturbance and suicidal ideas.       Vitals:    09/29/20 1511   Temp: 98.4 °F (36.9 °C)       Physical Exam  Constitutional:       General: She is not in acute distress.     Appearance: She is well-developed. She is not ill-appearing.   HENT:      Head: Normocephalic.   Eyes:      Pupils: Pupils are equal, round, and  reactive to light.   Cardiovascular:      Rate and Rhythm: Normal rate and regular rhythm.      Heart sounds: Normal heart sounds.   Pulmonary:      Effort: Pulmonary effort is normal.      Breath sounds: Normal breath sounds.   Abdominal:      General: Bowel sounds are normal. There is distension.      Palpations: Abdomen is soft. There is no mass.      Tenderness: There is no abdominal tenderness. There is no guarding or rebound.      Hernia: No hernia is present.   Musculoskeletal: Normal range of motion.   Skin:     General: Skin is warm and dry.   Neurological:      Mental Status: She is alert and oriented to person, place, and time.   Psychiatric:         Speech: Speech normal.         Behavior: Behavior normal.         Judgment: Judgment normal.         No radiology results for the last 7 days     Assessment and plan     1. Gastroesophageal reflux disease without esophagitis    2. Irritable bowel syndrome with both constipation and diarrhea    GERD seems stable at this time on Prevacid 30 mg twice daily and Pepcid 40 mg twice daily.  Continue GERD precautions.  IBS-mixed seems pretty good right now considering all she has going on.  Continue Levsin as needed.  Patient to call the office with any issues.  Patient to follow-up with me in 3 months.

## 2020-10-31 ENCOUNTER — RESULTS ENCOUNTER (OUTPATIENT)
Dept: INTERNAL MEDICINE | Facility: CLINIC | Age: 25
End: 2020-10-31

## 2020-10-31 DIAGNOSIS — E55.9 HYPOVITAMINOSIS D: ICD-10-CM

## 2020-10-31 DIAGNOSIS — E53.8 B12 DEFICIENCY: ICD-10-CM

## 2020-11-05 LAB
25(OH)D3+25(OH)D2 SERPL-MCNC: 24.5 NG/ML (ref 30–100)
VIT B12 SERPL-MCNC: 434 PG/ML (ref 211–946)

## 2020-11-05 NOTE — PROGRESS NOTES
Please let patient know that her B12 has increased.  I would continue with a daily over-the-counter supplement.  Vitamin D has actually decreased a bit to 24.5.  Please inquire whether she has been taking the 50,000 units weekly.  We need to have her continue doing so please send a refill for 50,000 units once weekly x3 months.  We will recheck at her 2/4/2021 appointment with me.

## 2020-11-19 ENCOUNTER — TELEPHONE (OUTPATIENT)
Dept: GASTROENTEROLOGY | Facility: CLINIC | Age: 25
End: 2020-11-19

## 2020-11-19 NOTE — TELEPHONE ENCOUNTER
----- Message from Dior Curry sent at 11/19/2020 10:09 AM EST -----  Regarding: Non-Urgent Medical Question  Contact: [phone]  Good Morning,     I was wondering if you could please provide me a letter that states that I have IBS which causes me to use the bathroom more frequently than most so I can give it to my employer. I can stop by the office to pick it up or if you could attach it to my documents on [employer] that would be great too. Please let me know if this is something you are able to do.     Thank you,  Dior Curry  [phone]

## 2020-11-19 NOTE — TELEPHONE ENCOUNTER
Yes that is fine when you can definitely write her up quick letter saying that she has to use the bathroom multiple times a day due to her gastrointestinal condition.  I am happy to sign it.  Thanks

## 2020-11-23 NOTE — TELEPHONE ENCOUNTER
Letter written and signed by Shanita OLVERA.  Called pt and advised of this . She verb understanding and would like to pick letter up. Advised letter will be at .  Verb understanding.

## 2020-12-02 ENCOUNTER — TELEPHONE (OUTPATIENT)
Dept: GASTROENTEROLOGY | Facility: CLINIC | Age: 25
End: 2020-12-02

## 2021-01-05 ENCOUNTER — OFFICE VISIT (OUTPATIENT)
Dept: GASTROENTEROLOGY | Facility: CLINIC | Age: 26
End: 2021-01-05

## 2021-01-05 VITALS — BODY MASS INDEX: 42.33 KG/M2 | HEIGHT: 62 IN | TEMPERATURE: 97.6 F | WEIGHT: 230 LBS

## 2021-01-05 DIAGNOSIS — K58.2 IRRITABLE BOWEL SYNDROME WITH BOTH CONSTIPATION AND DIARRHEA: ICD-10-CM

## 2021-01-05 DIAGNOSIS — K21.9 GASTROESOPHAGEAL REFLUX DISEASE WITHOUT ESOPHAGITIS: ICD-10-CM

## 2021-01-05 DIAGNOSIS — K82.8 CYST OF GALLBLADDER: Primary | ICD-10-CM

## 2021-01-05 PROCEDURE — 99214 OFFICE O/P EST MOD 30 MIN: CPT | Performed by: NURSE PRACTITIONER

## 2021-01-05 NOTE — PROGRESS NOTES
Chief Complaint   Patient presents with   • Heartburn   • Irritable Bowel Syndrome   • Cyst on  Gallladder       Dior Curry is a  25 y.o. female here for a follow up visit for gallbladder cyst.    HPI  25-year-old female presents today for follow-up visit for gallbladder cyst.  She is a patient of Dr. Curry.  She was last seen in the office by me on 9/29/2020.  She recently underwent a laparoscopic cystoscopy and hysteroscopy with her GYN at [facility].  She was told after the surgery that she has a gallbladder cyst.  She was instructed to follow-up with us about this.  This is new information to the patient.  She also has a history of GERD and admits she does well on Prevacid 30 mg twice a day and Pepcid 40 mg twice daily.  Initially right after her outpatient surgery she was taking ibuprofen and this seemed to make her reflux worse but since stopping the ibuprofen things have definitely calmed down.  Her last EGD was in 2015.  Her last colonoscopy was in 2018.  She does have a history of IBS-mixed and will occasionally have to use Levsin for abdominal pain and cramping.  She admits lately though her bowels been moving much better.  She denies any dysphagia, reflux, abdominal pain, nausea vomiting, diarrhea, constipation, rectal bleeding or melena.  She is appetite is good and her weight is stable.  Past Medical History:   Diagnosis Date   • Asthma    • Chronic headaches    • Depression    • Eczema    • Gastritis    • GERD (gastroesophageal reflux disease)    • Hemorrhoids    • History of bronchitis    • History of pneumonia    • IBS (irritable bowel syndrome)    • Migraine    • Migraines    • PCOS (polycystic ovarian syndrome)        Past Surgical History:   Procedure Laterality Date   • COLONOSCOPY  approx 2010    inflammation per patient   • COLONOSCOPY N/A 11/7/2018    Granular mucosa in TI , NBIH   • UPPER GASTROINTESTINAL ENDOSCOPY  approx 2015    slow transit per patient   • WISDOM TOOTH EXTRACTION          Scheduled Meds:    Continuous Infusions:No current facility-administered medications for this visit.       PRN Meds:.    No Known Allergies    Social History     Socioeconomic History   • Marital status: Significant Other     Spouse name: Not on file   • Number of children: Not on file   • Years of education: Not on file   • Highest education level: Not on file   Tobacco Use   • Smoking status: Never Smoker   • Smokeless tobacco: Never Used   Substance and Sexual Activity   • Alcohol use: Yes     Frequency: Monthly or less     Comment: very seldom   • Drug use: No   • Sexual activity: Yes     Birth control/protection: Pill       Family History   Problem Relation Age of Onset   • Ulcerative colitis Mother    • Fibromyalgia Mother    • Rheum arthritis Mother    • Hypertension Mother    • Diabetes type II Father    • Hypertension Father    • Malig Hyperthermia Neg Hx        Review of Systems   Constitutional: Negative for appetite change, chills, diaphoresis, fatigue, fever and unexpected weight change.   HENT: Negative for nosebleeds, postnasal drip, sore throat, trouble swallowing and voice change.    Respiratory: Negative for cough, choking, chest tightness, shortness of breath and wheezing.    Cardiovascular: Negative for chest pain, palpitations and leg swelling.   Gastrointestinal: Negative for abdominal distention, abdominal pain, anal bleeding, blood in stool, constipation, diarrhea, nausea, rectal pain and vomiting.   Endocrine: Negative for polydipsia, polyphagia and polyuria.   Musculoskeletal: Negative for gait problem.   Skin: Negative for rash and wound.   Allergic/Immunologic: Negative for food allergies.   Neurological: Negative for dizziness, speech difficulty and light-headedness.   Psychiatric/Behavioral: Negative for confusion, self-injury, sleep disturbance and suicidal ideas.       Vitals:    01/05/21 1127   Temp: 97.6 °F (36.4 °C)       Physical Exam  Constitutional:       General: She is  not in acute distress.     Appearance: She is well-developed. She is not ill-appearing.   HENT:      Head: Normocephalic.   Eyes:      Pupils: Pupils are equal, round, and reactive to light.   Cardiovascular:      Rate and Rhythm: Normal rate and regular rhythm.      Heart sounds: Normal heart sounds.   Pulmonary:      Effort: Pulmonary effort is normal.      Breath sounds: Normal breath sounds.   Abdominal:      General: Bowel sounds are normal. There is distension.      Palpations: Abdomen is soft. There is no mass.      Tenderness: There is no abdominal tenderness. There is no guarding or rebound.      Hernia: No hernia is present.   Musculoskeletal: Normal range of motion.   Skin:     General: Skin is warm and dry.   Neurological:      Mental Status: She is alert and oriented to person, place, and time.   Psychiatric:         Speech: Speech normal.         Behavior: Behavior normal.         Judgment: Judgment normal.         No radiology results for the last 7 days     Assessment and plan     1. Cyst of gallbladder  - US Gallbladder; Future    2. Gastroesophageal reflux disease without esophagitis    3. Irritable bowel syndrome with both constipation and diarrhea      Reviewed postop report on the [facility] system which did indicate a small gallbladder cyst.  Will go ahead and order an ultrasound of the gallbladder to get a better look.  Overall patient seems to be doing quite well right now.  Reflux seems pretty well controlled on Prevacid 30 mg twice daily and Pepcid 40 mg twice daily.  Continue GERD precautions.  Continue Levsin as needed.  IBS-mixed seems well controlled right now.  Bowels are moving well.  Patient to call the office with any issues.  Patient to follow-up with Dr. Curry in 1 to 2 months.  Patient is agreeable to the plan.

## 2021-01-23 ENCOUNTER — HOSPITAL ENCOUNTER (OUTPATIENT)
Dept: ULTRASOUND IMAGING | Facility: HOSPITAL | Age: 26
Discharge: HOME | End: 2021-01-23
Admitting: NURSE PRACTITIONER

## 2021-01-23 DIAGNOSIS — K82.8 CYST OF GALLBLADDER: ICD-10-CM

## 2021-01-23 PROCEDURE — 76705 ECHO EXAM OF ABDOMEN: CPT

## 2021-01-26 ENCOUNTER — TELEPHONE (OUTPATIENT)
Dept: GASTROENTEROLOGY | Facility: CLINIC | Age: 26
End: 2021-01-26

## 2021-01-26 NOTE — TELEPHONE ENCOUNTER
----- Message from EDGARDO Vo sent at 1/25/2021  3:50 PM EST -----  CONCLUSION:  1. The gallbladder is normal, no biliary duct dilatation.  2. Increased hepatic echotexture suggests fatty infiltration within an  area of fatty sparing within the left lobe.  3. Right kidney and pancreas within normal limits.       Please call the patient with results of her ultrasound.  Gallbladder looks good.  She does have fatty liver.  If this is a new diagnosis for her tell her we can talk about this further at her next visit.  Otherwise everything looks good.

## 2021-02-01 DIAGNOSIS — E55.9 HYPOVITAMINOSIS D: ICD-10-CM

## 2021-02-01 DIAGNOSIS — Z86.69 HISTORY OF MIGRAINE: ICD-10-CM

## 2021-02-01 RX ORDER — ERGOCALCIFEROL 1.25 MG/1
CAPSULE ORAL
Qty: 8 CAPSULE | Refills: 1 | Status: SHIPPED | OUTPATIENT
Start: 2021-02-01 | End: 2021-05-17

## 2021-02-01 RX ORDER — NORTRIPTYLINE HYDROCHLORIDE 10 MG/1
CAPSULE ORAL
Qty: 90 CAPSULE | Refills: 1 | Status: SHIPPED | OUTPATIENT
Start: 2021-02-01 | End: 2022-10-12

## 2021-02-04 ENCOUNTER — OFFICE VISIT (OUTPATIENT)
Dept: INTERNAL MEDICINE | Facility: CLINIC | Age: 26
End: 2021-02-04

## 2021-02-04 ENCOUNTER — APPOINTMENT (OUTPATIENT)
Dept: WOMENS IMAGING | Facility: HOSPITAL | Age: 26
End: 2021-02-04

## 2021-02-04 VITALS
TEMPERATURE: 99 F | OXYGEN SATURATION: 99 % | HEIGHT: 62 IN | DIASTOLIC BLOOD PRESSURE: 84 MMHG | SYSTOLIC BLOOD PRESSURE: 124 MMHG | HEART RATE: 97 BPM | BODY MASS INDEX: 44.53 KG/M2 | WEIGHT: 242 LBS

## 2021-02-04 DIAGNOSIS — K58.2 IRRITABLE BOWEL SYNDROME WITH BOTH CONSTIPATION AND DIARRHEA: ICD-10-CM

## 2021-02-04 DIAGNOSIS — E55.9 HYPOVITAMINOSIS D: ICD-10-CM

## 2021-02-04 DIAGNOSIS — L98.9 SKIN LESION: ICD-10-CM

## 2021-02-04 DIAGNOSIS — Z13.6 ENCOUNTER FOR LIPID SCREENING FOR CARDIOVASCULAR DISEASE: ICD-10-CM

## 2021-02-04 DIAGNOSIS — Z13.220 ENCOUNTER FOR LIPID SCREENING FOR CARDIOVASCULAR DISEASE: ICD-10-CM

## 2021-02-04 DIAGNOSIS — G43.809 OTHER MIGRAINE WITHOUT STATUS MIGRAINOSUS, NOT INTRACTABLE: Chronic | ICD-10-CM

## 2021-02-04 DIAGNOSIS — M54.2 CHRONIC NECK PAIN: Primary | ICD-10-CM

## 2021-02-04 DIAGNOSIS — Z00.00 ANNUAL PHYSICAL EXAM: ICD-10-CM

## 2021-02-04 DIAGNOSIS — K21.9 GASTROESOPHAGEAL REFLUX DISEASE WITHOUT ESOPHAGITIS: ICD-10-CM

## 2021-02-04 DIAGNOSIS — G89.29 CHRONIC NECK PAIN: Primary | ICD-10-CM

## 2021-02-04 DIAGNOSIS — G89.29 CHRONIC HAND PAIN, UNSPECIFIED LATERALITY: ICD-10-CM

## 2021-02-04 DIAGNOSIS — M79.643 CHRONIC HAND PAIN, UNSPECIFIED LATERALITY: ICD-10-CM

## 2021-02-04 PROBLEM — R10.2 PELVIC PAIN IN FEMALE: Status: ACTIVE | Noted: 2021-02-04

## 2021-02-04 PROBLEM — N93.9 ABNORMAL UTERINE BLEEDING (AUB): Status: ACTIVE | Noted: 2020-12-09

## 2021-02-04 PROBLEM — E66.01 CLASS 3 SEVERE OBESITY DUE TO EXCESS CALORIES WITH BODY MASS INDEX (BMI) OF 40.0 TO 44.9 IN ADULT: Status: ACTIVE | Noted: 2021-02-04

## 2021-02-04 PROBLEM — E16.2 HYPOGLYCEMIA: Status: ACTIVE | Noted: 2019-04-29

## 2021-02-04 PROBLEM — G43.909 MIGRAINES: Chronic | Status: ACTIVE | Noted: 2017-11-07

## 2021-02-04 PROBLEM — G43.909 MIGRAINES: Status: ACTIVE | Noted: 2017-11-07

## 2021-02-04 PROBLEM — E66.813 CLASS 3 SEVERE OBESITY DUE TO EXCESS CALORIES WITH BODY MASS INDEX (BMI) OF 40.0 TO 44.9 IN ADULT: Chronic | Status: ACTIVE | Noted: 2021-02-04

## 2021-02-04 PROBLEM — E66.813 CLASS 3 SEVERE OBESITY DUE TO EXCESS CALORIES WITH BODY MASS INDEX (BMI) OF 40.0 TO 44.9 IN ADULT: Status: ACTIVE | Noted: 2021-02-04

## 2021-02-04 PROBLEM — E66.01 CLASS 3 SEVERE OBESITY DUE TO EXCESS CALORIES WITH BODY MASS INDEX (BMI) OF 40.0 TO 44.9 IN ADULT: Chronic | Status: ACTIVE | Noted: 2021-02-04

## 2021-02-04 PROBLEM — Z86.69 HISTORY OF MIGRAINE: Status: RESOLVED | Noted: 2020-08-27 | Resolved: 2021-02-04

## 2021-02-04 PROCEDURE — 72050 X-RAY EXAM NECK SPINE 4/5VWS: CPT | Performed by: NURSE PRACTITIONER

## 2021-02-04 PROCEDURE — 72050 X-RAY EXAM NECK SPINE 4/5VWS: CPT | Performed by: RADIOLOGY

## 2021-02-04 PROCEDURE — 99395 PREV VISIT EST AGE 18-39: CPT | Performed by: NURSE PRACTITIONER

## 2021-02-04 NOTE — ASSESSMENT & PLAN NOTE
Rheumatologic/autoimmune labs were unrevealing in August 2020.  Ordering physical therapy as well as x-ray of the cervical spine.  I have asked her to use cock-up splint consistently while at work and at night.  If carpal tunnel is contributing we will see if the splints help.  If after 1 to 2 months of consistent use, PT and splints have not helped, we can get her to a hand specialist for further evaluation.

## 2021-02-04 NOTE — PROGRESS NOTES
"Chief Complaint  Annual Exam (Physical)    Subjective          Dior Curry presents to [facility] for   Patient presents for annual physical.  This is a 25-year-old female.    She has noticed a new mole on her abdomen.  This has popped up over the past couple of months.  It is dark and is not itchy or painful.  She wonders whether it needs to be checked by a dermatologist.    She has chronic migraines and is following with neurology later this month.  Currently taking nortriptyline 10 mg nightly.    She has IBS and is following with GI, Dr. Curry.    She has GERD and takes Prevacid.  This is relatively well controlled with avoidance of trigger foods and Prevacid.    She has vitamin D deficiency and takes 50,000 units weekly of vitamin D reporting good compliance with this regimen.    She endorses numbness and tingling of the bilateral hands along with aching pain.  Her mother has autoimmune disorders and in August 2020 I screened her for autoimmune disorders and rheumatoid labs, all of which were unremarkable.  She continues to have the symptoms in her hands and has developed numbness and tingling in the left shoulder going down the left arm.  She endorses chronic neck pain and wonders whether this is contributing.  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    She drinks alcohol monthly or less, not in excess.  Never smoker.  Endorses a generally well-balanced diet.  She goes to the dentist and optometrist routinely.  She does not exercise regularly.  She sees gynecology for routine Pap smears and pelvic exams.  Refuses a flu shot.    Denies development of any other new issues today.      Objective   Vital Signs:   /84 (BP Location: Left arm, Patient Position: Sitting, Cuff Size: Adult)   Pulse 97   Temp 99 °F (37.2 °C)   Ht 157.5 cm (62\")   Wt 110 kg (242 lb)   SpO2 99%   BMI 44.26 kg/m²     Physical Exam  Vitals signs and " nursing note reviewed.   Constitutional:       General: She is not in acute distress.     Appearance: Normal appearance. She is well-developed. She is obese. She is not ill-appearing, toxic-appearing or diaphoretic.   HENT:      Head: Normocephalic and atraumatic.      Right Ear: Tympanic membrane, ear canal and external ear normal.      Left Ear: Tympanic membrane, ear canal and external ear normal.   Eyes:      Pupils: Pupils are equal, round, and reactive to light.   Neck:      Musculoskeletal: Normal range of motion and neck supple. No neck rigidity or muscular tenderness.      Vascular: No carotid bruit.   Cardiovascular:      Rate and Rhythm: Normal rate and regular rhythm.      Pulses: Normal pulses.      Heart sounds: Normal heart sounds.      Comments: No peripheral edema  Pulmonary:      Effort: Pulmonary effort is normal. No respiratory distress.      Breath sounds: Normal breath sounds. No stridor. No wheezing, rhonchi or rales.   Chest:      Chest wall: No tenderness.   Abdominal:      General: Bowel sounds are normal. There is no distension.      Palpations: Abdomen is soft. There is no mass.      Tenderness: There is no abdominal tenderness. There is no right CVA tenderness, left CVA tenderness, guarding or rebound.      Hernia: No hernia is present.   Musculoskeletal: Normal range of motion.   Lymphadenopathy:      Cervical: No cervical adenopathy.   Skin:     General: Skin is warm and dry.      Capillary Refill: Capillary refill takes less than 2 seconds.   Neurological:      General: No focal deficit present.      Mental Status: She is alert and oriented to person, place, and time. Mental status is at baseline.   Psychiatric:         Mood and Affect: Mood normal.         Behavior: Behavior normal.         Thought Content: Thought content normal.         Judgment: Judgment normal.        Result Review :                 Assessment and Plan    Problem List Items Addressed This Visit        Endocrine  and Metabolic    Hypovitaminosis D (Chronic)    Current Assessment & Plan     Continue 50,000 units of vitamin D weekly.  Vitamin D level with labs and we will adjust therapy if needed.         Relevant Orders    Vitamin D 25 hydroxy       Gastrointestinal Abdominal     Irritable bowel syndrome with both constipation and diarrhea (Chronic)    Current Assessment & Plan     Following with GI routinely, continue current direction per GI.         Relevant Orders    CBC No Differential    Comprehensive metabolic panel    TSH Rfx On Abnormal To Free T4    Gastroesophageal reflux disease without esophagitis (Chronic)    Current Assessment & Plan     Well-controlled with Prevacid, avoidance of trigger foods and GI follow-ups, continue current therapy.         Relevant Orders    CBC No Differential    Comprehensive metabolic panel    TSH Rfx On Abnormal To Free T4       Musculoskeletal and Injuries    Chronic neck pain - Primary (Chronic)    Current Assessment & Plan     This may be contributing to her peripheral symptoms of the left arm and bilateral hand tingling and numbness.  Checking an x-ray of the cervical spine today and I have ordered PT.         Relevant Orders    Ambulatory Referral to Physical Therapy    XR Spine Cervical Complete 4 or 5 View (In Office) (Completed)    Chronic hand pain (Chronic)    Current Assessment & Plan     Rheumatologic/autoimmune labs were unrevealing in August 2020.  Ordering physical therapy as well as x-ray of the cervical spine.  I have asked her to use cock-up splint consistently while at work and at night.  If carpal tunnel is contributing we will see if the splints help.  If after 1 to 2 months of consistent use, PT and splints have not helped, we can get her to a hand specialist for further evaluation.         Relevant Orders    Ambulatory Referral to Physical Therapy       Neuro    Migraines (Chronic)    Current Assessment & Plan     She is seeing EDGARDO Hollins with  neurology later this month.  Continue nortriptyline for now with further recommendations pending per neurology.               Other Visit Diagnoses     Annual physical exam        Encounter for lipid screening for cardiovascular disease        Relevant Orders    Lipid panel    Skin lesion        Referral to dermatology entered.    Relevant Orders    Ambulatory Referral to Dermatology        We will contact patient with her lab and imaging results and any further recommendations.  Follow-up as needed and I will see her back in 6 months for recheck of chronic conditions/routine health maintenance.    Follow Up   Return in about 6 months (around 8/4/2021).  Patient was given instructions and counseling regarding her condition or for health maintenance advice. Please see specific information pulled into the AVS if appropriate.

## 2021-02-04 NOTE — ASSESSMENT & PLAN NOTE
Obesity is worsening, she has gained about 12 pounds over the past few months.  Discussed diet and exercise recommendations for goal of 30 minutes/day at least 5 days/week of physical activity and decreasing portion size, carbs and sugars in the diet.

## 2021-02-04 NOTE — ASSESSMENT & PLAN NOTE
She is seeing EDGARDO Hollins with neurology later this month.  Continue nortriptyline for now with further recommendations pending per neurology.

## 2021-02-04 NOTE — ASSESSMENT & PLAN NOTE
Well-controlled with Prevacid, avoidance of trigger foods and GI follow-ups, continue current therapy.

## 2021-02-04 NOTE — ASSESSMENT & PLAN NOTE
This may be contributing to her peripheral symptoms of the left arm and bilateral hand tingling and numbness.  Checking an x-ray of the cervical spine today and I have ordered PT.

## 2021-02-08 NOTE — PROGRESS NOTES
Good afternoon Ms. Curry, your x-ray of the cervical spine is back and showing no acute abnormalities.  The disc spaces are well-maintained and there is no evidence of degenerative change or fractures.  We will continue with physical therapy and the cock-up splints.  Let me know how your symptoms progress and if needed we can refer to a specialist.  Let me know if you have any questions and have a great day,    EDGARDO Stephenson

## 2021-02-11 ENCOUNTER — TELEPHONE (OUTPATIENT)
Dept: INTERNAL MEDICINE | Facility: CLINIC | Age: 26
End: 2021-02-11

## 2021-02-11 NOTE — TELEPHONE ENCOUNTER
PATIENT STATES: would like to change lab appt please advise      PATIENT CAN BE REACHED ON:[phone]

## 2021-02-16 ENCOUNTER — TELEMEDICINE (OUTPATIENT)
Dept: NEUROLOGY | Facility: CLINIC | Age: 26
End: 2021-02-16

## 2021-02-16 DIAGNOSIS — G43.019 INTRACTABLE MIGRAINE WITHOUT AURA AND WITHOUT STATUS MIGRAINOSUS: Primary | ICD-10-CM

## 2021-02-16 PROCEDURE — 99214 OFFICE O/P EST MOD 30 MIN: CPT | Performed by: NURSE PRACTITIONER

## 2021-02-16 RX ORDER — GALCANEZUMAB 120 MG/ML
120 INJECTION, SOLUTION SUBCUTANEOUS
Qty: 1 ML | Refills: 1 | OUTPATIENT
Start: 2021-02-16 | End: 2021-09-01

## 2021-02-16 RX ORDER — GALCANEZUMAB 120 MG/ML
240 INJECTION, SOLUTION SUBCUTANEOUS ONCE
Qty: 2 ML | Refills: 0 | Status: SHIPPED | OUTPATIENT
Start: 2021-02-16 | End: 2021-02-16

## 2021-02-16 NOTE — PROGRESS NOTES
Subjective   Dior Curry is a 25 y.o. female presenting for evaluation for migraine headaches. She states she has had them for about 6-7 years now. Her previous neurologist, Dr. Alcala, retired.     She says she had an MRI of her brain about 4 years ago that was normal.    She is currently on nortriptyline. She has tried amitriptyline and topiramate with side effects so she had to d/c them. She has tried sumatriptan as well but did not tolerate that medication either.     She does not smoke. Drinks alcohol seldomly. Denies drug use.     She currently has about 4 migraines per month. Each migraine lasts several days at a time. The pain is occipital or bifrontal. Described as throbbing. She has photophobia, phonophobia, nausea, imbalance, and difficulty concentrating when she has the migraines.         I performed this clinical encounter by utilizing a real-time telehealth video connection between my location and the patient's location at home.  I obtained verbal consent from the patient to perform this clinical encounter utilizing video and prepared the patient by answering any questions they had about the telehealth interaction.    Total time of video encounter was 25 minutes.     History of Present Illness     The following portions of the patient's history were reviewed and updated as appropriate: allergies, current medications, past family history, past medical history, past social history, past surgical history and problem list.    Review of Systems   Constitutional: Negative.    HENT: Negative.    Eyes: Negative.    Respiratory: Negative.    Cardiovascular: Negative.    Gastrointestinal: Negative.    Endocrine: Negative.    Genitourinary: Negative.    Musculoskeletal: Positive for neck pain.   Skin: Negative.    Allergic/Immunologic: Negative.    Neurological: Positive for dizziness, headache and confusion.   Hematological: Negative.        Objective   Physical Exam  Not performed due to limitations of video  visit.     Assessment/Plan      Diagnoses and all orders for this visit:    1. Intractable migraine without aura and without status migrainosus (Primary)    Other orders  -     galcanezumab-gnlm (Emgality) 120 MG/ML prefilled syringe; Inject 2 mL under the skin into the appropriate area as directed 1 (One) Time for 1 dose.  Dispense: 2 mL; Refill: 0  -     galcanezumab-gnlm (Emgality) 120 MG/ML prefilled syringe; Inject 1 mL under the skin into the appropriate area as directed Every 30 (Thirty) Days.  Dispense: 1 mL; Refill: 1      The patient presents today with at least 12 migraine headache days per month, despite remaining on nortriptyline 10 mg daily. I am going to have her continue this but will add Emgality. She will start with 240 mg loading dose and then 120 mg loading dose every 30 days. R/B/SE reviewed. Instructions given. She will call with any problems. I will see her back in 8-10 weeks to see how this is working. We did discuss Botox injections very briefly today. That may be a good option for her if the Emgality is not helpful.

## 2021-02-25 ENCOUNTER — HOSPITAL ENCOUNTER (OUTPATIENT)
Dept: PHYSICAL THERAPY | Facility: HOSPITAL | Age: 26
Setting detail: THERAPIES SERIES
Discharge: HOME | End: 2021-02-25

## 2021-02-25 DIAGNOSIS — G44.86 CERVICOGENIC HEADACHE: ICD-10-CM

## 2021-02-25 DIAGNOSIS — M25.511 CHRONIC RIGHT SHOULDER PAIN: ICD-10-CM

## 2021-02-25 DIAGNOSIS — M54.2 CERVICAL PAIN: Primary | ICD-10-CM

## 2021-02-25 DIAGNOSIS — R20.2 PARESTHESIA OF BOTH HANDS: ICD-10-CM

## 2021-02-25 DIAGNOSIS — G89.29 CHRONIC RIGHT SHOULDER PAIN: ICD-10-CM

## 2021-02-25 PROCEDURE — 97110 THERAPEUTIC EXERCISES: CPT

## 2021-02-25 PROCEDURE — 97162 PT EVAL MOD COMPLEX 30 MIN: CPT

## 2021-02-25 NOTE — THERAPY EVALUATION
Outpatient Physical Therapy Ortho Initial Evaluation  [facility]     Patient Name: Dior Curry  : 1995  MRN: [id number]  Today's Date: 2021      Visit Date: 2021    Patient Active Problem List   Diagnosis   • Drug screening, pre-employment   • Irritable bowel syndrome with both constipation and diarrhea   • Abdominal cramping   • Family history of ulcerative colitis   • Moderate persistent asthma without complication   • Reactive hypoglycemia   • Gastroesophageal reflux disease without esophagitis   • Anxiety and depression   • PCOS (polycystic ovarian syndrome)   • Abnormal uterine bleeding (AUB)   • Hypoglycemia   • Migraines   • Pelvic pain in female   • Class 3 severe obesity due to excess calories with body mass index (BMI) of 40.0 to 44.9 in adult (CMS/HCC)   • Chronic neck pain   • Chronic hand pain   • Hypovitaminosis D        Past Medical History:   Diagnosis Date   • Asthma    • Chronic headaches    • Depression    • Eczema    • Gastritis    • GERD (gastroesophageal reflux disease)    • Hemorrhoids    • History of bronchitis    • History of pneumonia    • IBS (irritable bowel syndrome)    • Migraine    • Migraines    • PCOS (polycystic ovarian syndrome)         Past Surgical History:   Procedure Laterality Date   • COLONOSCOPY  approx     inflammation per patient   • COLONOSCOPY N/A 2018    Granular mucosa in TI , NBIH   • UPPER GASTROINTESTINAL ENDOSCOPY  approx     slow transit per patient   • WISDOM TOOTH EXTRACTION         Visit Dx:     ICD-10-CM ICD-9-CM   1. Cervical pain  M54.2 723.1   2. Cervicogenic headache  R51.9 784.0   3. Chronic right shoulder pain  M25.511 719.41    G89.29 338.29   4. Paresthesia of both hands  R20.2 782.0         Patient History     Row Name 21 1500             History    Chief Complaint  Pain;Tinglings  -RS      Type of Pain  Neck pain;Hand pain  -RS      Date Current Problem(s) Began  -- pt reports a chronic issue since she  was approx 15 yo  -RS      Brief Description of Current Complaint  The pt is a 25 yo who presents with frequent migraines, she was getting migraines 3-4 times per week, she started taking medication  And the frequency  Decreased to 3-4 times per month, they last 2-3 days. She has aura with her migraines and is sensitive to light, has dizziness, nausea. She has begun to have R hand pain and tingling as well as L shoulder pain made worse with shoulder flex than abduction. 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 She enjoys crafting, making beads out of migel and T-shirts with her cricket.  -RS      Patient/Caregiver Goals  Relieve pain;Know what to do to help the symptoms;Improve strength;Improve mobility  -RS      Hand Dominance  right-handed  -RS      Occupation/sports/leisure activities  desk job, at home currently, enjoys crafting and spending time with her dog  -RS         Pain     Pain Location  Head;Hand;Shoulder  -RS      Pain at Present  3;4  -RS      Pain at Worst  8;9  -RS      Difficulties at work?  using mouse, prolonged sitting  -RS         Fall Risk Assessment    Any falls in the past year:  No  -RS         Services    Are you currently receiving Home Health services  No  -RS         Daily Activities    Primary Language  English  -RS      Pt Participated in POC and Goals  Yes  -RS         Safety    Are you being hurt, hit, or frightened by anyone at home or in your life?  No  -RS      Are you being neglected by a caregiver  No  -RS        User Key  (r) = Recorded By, (t) = Taken By, (c) = Cosigned By    Initials Name Provider Type    RS Patsy Chiu PT Physical Therapist          PT Ortho     Row Name 02/25/21 1500       Posture/Observations    Alignment Options  Forward head;Thoracic kyphosis;Rounded shoulders  -RS    Forward Head  Mild;Moderate  -RS    Thoracic Kyphosis  Mild  -RS    Rounded Shoulders  Mild  -RS        Myotomal Screen- Upper Quarter Clearing    Shoulder flexion (C5)  Right:;4- (Good -);Left:;3+ (Fair +) L pain  -RS    Elbow flexion/wrist extension (C6)  Right:;4 (Good);Left:;4- (Good -)  -RS    Elbow extension/wrist flexion (C7)  Right:;4 (Good);Left:;4- (Good -)  -RS       Cervical/Shoulder ROM Screen    Cervical flexion  Normal  -RS    Cervical extension  Normal  -RS    Cervical lateral flexion  Impaired Has stretch on the L with R lat flex  -RS    Cervical rotation  Normal no pain  -RS       Special Tests/Palpation    Special Tests/Palpation  Cervical/Thoracic  -RS       Cervical Palpation    Cervical Palpation- Location?  Suboccipital;Levator scapula;Middle traps  -RS    Suboccipital  Bilateral:;Tender  -RS    Levator Scapula  Bilateral:;Left:;Tender;Guarded/taut  -RS    Middle Traps  Bilateral:;Tender;Guarded/taut  -RS       Cervical Accessory Motions    Cervical Accessory Motions Tested?  Yes hypomobility noted mid to lower cervical spine  -RS       Thoracic Accessory Motions    Thoracic Accessory Motions Tested?  Yes  -RS    Pa glide- Upper thoracic  Center:;Hypomobile  -RS    Pa glide- Middle thoracic  Center:;Hypomobile  -RS       General ROM    LT Upper Ext  Lt Shoulder ABduction;Lt Shoulder Flexion  -RS    GENERAL ROM COMMENTS  R shoulder WFL flex and ABduction  -RS       Left Upper Ext    Lt Shoulder Abduction AROM  0-150  -RS    Lt Shoulder Flexion AROM  0-110 with inc pain  -RS       MMT (Manual Muscle Testing)    Rt Upper Ext  Rt Wrist Flexion;Rt Wrist Extension  -RS    Lt Upper Ext  Lt Wrist Flexion;Lt Wrist Extension  -RS       MMT Right Upper Ext    Rt Wrist Flexion MMT, Gross Movement  (4-/5) good minus  -RS    Rt Wrist Extension MMT, Gross Movement  (4/5) good  -RS    Rt Upper Extremity Comments   pain with wrist flex and ext near base of thumb  -RS       MMT Left Upper Ext    Lt Wrist Flexion MMT, Gross Movement  (4-/5) good minus  -RS    Lt Wrist Extension MMT, Gross Movement  (4/5) good  -RS         Strength Right    # Reps  3  -RS    Right Rung  3  -RS    Right  Test 1  45  -RS    Right  Test 2  25  -RS    Right  Test 3  20  -RS     Strength Average Right  30  -RS        Strength Left    # Reps  3  -RS    Left Rung  3  -RS    Left  Test 1  25  -RS    Left  Test 2  15  -RS    Left  Test 3  5  -RS     Strength Average Left  15  -RS       Sensation    Additional Comments  pt reports B hands tingling when in supine position, intact to light touch in all UE dermatomes  -RS       Hand  Strength     Strength Affected Side  Bilateral  -RS      User Key  (r) = Recorded By, (t) = Taken By, (c) = Cosigned By    Initials Name Provider Type    RS Patsy Chiu, PT Physical Therapist                      Therapy Education  Education Details: Access coded: [email or web address]. Role of outpatient PT, POC, differential diagnosis, initial HEP, expectations, goals, anatomy.  Given: HEP, Symptoms/condition management  Program: New  How Provided: Verbal, Written, Demonstration  Provided to: Patient  Level of Understanding: Verbalized, Demonstrated     PT OP Goals     Row Name 02/25/21 1700          PT Short Term Goals    STG Date to Achieve  03/18/21  -RS     STG 1  The pt will report at least 40% reduction in frequency of R hand numbness using appropriate work and posture modifications.  -RS     STG 1 Progress  New  -RS        Long Term Goals    LTG Date to Achieve  04/26/21  -RS     LTG 1  The pt will demonstrate IND and compliant with HEP focused on return to PLOF and IND condition management.  -RS     LTG 1 Progress  New  -RS     LTG 2  The pt will score less than 20% disability on the NDI to indicate improved perceived performance of ADLs.  -RS     LTG 2 Progress  New  -RS     LTG 3  The pt will demonstrate L shoulder flexion AROM to at least 0-140 to facilitate improved functional reach  -RS     LTG 3 Progress  New  -RS     LTG 4  The pt will demonstrate B  strength to at  least 40 pounds (average of 3 trials) to facilitate improved self care and recreational activity performance.  -RS     LTG 4 Progress  New  -RS     LTG 5  The pt will report no more than 6 total HA days per month to facilitate improved QOL and functional activity tolerance.  -RS     LTG 5 Progress  New  -RS     LTG 6  --  -RS        Time Calculation    PT Goal Re-Cert Due Date  05/26/21  -RS       User Key  (r) = Recorded By, (t) = Taken By, (c) = Cosigned By    Initials Name Provider Type    RS Patsy Chiu, PT Physical Therapist          PT Assessment/Plan     Row Name 02/25/21 1600          PT Assessment    Functional Limitations  Performance in work activities;Performance in self-care ADL;Performance in leisure activities;Limitations in functional capacity and performance;Limitation in home management  -RS     Impairments  Range of motion;Posture;Poor body mechanics;Pain;Muscle strength;Joint mobility;Sensation  -RS     Assessment Comments  Dior Curry is a 26 y.o. female referred to physical therapy for neck pain/headaches. She presents with an evolving clinical presentation, along with no remarkable comorbidities and personal factors of sedentary lifestyle, desk job, chronicty of pain, pt  fear avoidance beliefs that may impact her progress in the plan of care. Pt presents today with decreased L shoulder flexion AROM, decreased B UE  strength, pain with cervical sidebending (stretch L), increased tenderness to palpation B UT/levator scap/ suboccipitals. her signs and symptoms are consistent with a physical therapy diagnosis of cervicogenic headaches and decreased scapular strength. The previous impairments limit her ability to perform work duties, sleep without waking in pain, participate in recreation, QOL w ith 3-4 HA per month. The pt self scores 30% disability on the  NDI (0I=no disability).Pt will benefit from skilled PT to address the previous impairments and return to PLOF.  -RS     Please  refer to paper survey for additional self-reported information  Yes  -RS     Rehab Potential  Good  -RS     Patient/caregiver participated in establishment of treatment plan and goals  Yes  -RS     Patient would benefit from skilled therapy intervention  Yes  -RS        PT Plan    PT Frequency  2x/week  -RS     Predicted Duration of Therapy Intervention (PT)  8 weeks for a total of 10-12 sessions  -RS     Planned CPT's?  PT EVAL MOD COMPLELITY: 95390;PT RE-EVAL: 23413;PT THER PROC EA 15 MIN: 58622;PT THER ACT EA 15 MIN: 03192;PT MANUAL THERAPY EA 15 MIN: 55203;PT NEUROMUSC RE-EDUCATION EA 15 MIN: 56907;PT SELF CARE/HOME MGMT/TRAIN EA 15: 62550;PT HOT OR COLD PACK TREAT [facility];PT AQUATIC THERAPY EA 15 MIN: 14081;PT ELECTRICAL STIM UNATTEND: ;PT ULTRASOUND EA 15 MIN: 05316;PT TRACTION CERVICAL: 15987  -RS     PT Plan Comments  Assess tolerance for initial HEP, consider manual therapy cervical and thorcic spine. Consider doorway stretch gentle, scap retract, supine HA with TB, chin tuck and turn.  -RS       User Key  (r) = Recorded By, (t) = Taken By, (c) = Cosigned By    Initials Name Provider Type    RS Patsy Chiu, ALBA Physical Therapist            OP Exercises     Row Name 02/25/21 1600             Total Minutes    21680 - PT Therapeutic Exercise Minutes  11  -RS         Exercise 1    Exercise Name 1  supine chin tuck  -RS      Cueing 1  Verbal;Tactile  -RS      Reps 1  10  -RS      Time 1  5s  -RS         Exercise 2    Exercise Name 2  post shoulder roll  -RS      Cueing 2  Verbal;Demo  -RS      Reps 2  10  -RS         Exercise 3    Exercise Name 3  levator scap  -RS      Cueing 3  Verbal;Demo  -RS      Reps 3  3  -RS      Time 3  20s  -RS         Exercise 4    Exercise Name 4  supine over towel  -RS      Cueing 4  Verbal  -RS      Time 4  2 min  -RS        User Key  (r) = Recorded By, (t) = Taken By, (c) = Cosigned By    Initials Name Provider Type    RS Patsy Chiu PT Physical Therapist                                   Time Calculation:     Start Time: 1515  Stop Time: 1600  Time Calculation (min): 45 min     Therapy Charges for Today     Code Description Service Date Service Provider Modifiers Qty    89203255546 HC PT THER PROC EA 15 MIN 2/25/2021 Patsy Chiu, PT GP 1    [id number]  PT EVAL MOD COMPLEXITY 2 2/25/2021 Patsy Chiu, PT GP 1                    Patsy Chiu, PT  2/25/2021

## 2021-03-04 ENCOUNTER — TELEPHONE (OUTPATIENT)
Dept: NEUROLOGY | Facility: CLINIC | Age: 26
End: 2021-03-04

## 2021-03-04 NOTE — TELEPHONE ENCOUNTER
Called and left patient a detailed voice mail, regarding insurance. I need to know if patient has new insurance, along with the information.

## 2021-03-04 NOTE — TELEPHONE ENCOUNTER
HILTON STRATTON  [phone]    HILTON JUST TURNED 26 ON 2/19 AND STATED SHE WILL BE TAKEN OFF OF HER FATHER'S INSURANCE ON 3/22. SHE IS IN A 31 DAY THO PERIOD SO HER CURRENT INSURANCE TOLD HER THEY WOULD STILL COVER THE COST OF HER MRI. SHE STATED THE OFFICE COULD GO AHEAD AND SCHEDULE THE MRI, IT WOULD JUST HAVE TO BE BEFORE 3/22. SHE STATED ONCE SHE GETS HER INSURANCE CARDS FROM HER NEW INSURANCE PLAN SHE WILL CALL IN TO GET ALL OF THAT UPDATE. HER NEW INSURANCE PLAN DOES NOT GO INTO AFFECT UNTIL 3/23.  IF YOU HAVE ANYMORE QUESTIONS PLEASE GIVE HER A CALL BACK.

## 2021-03-10 ENCOUNTER — HOSPITAL ENCOUNTER (OUTPATIENT)
Dept: PHYSICAL THERAPY | Facility: HOSPITAL | Age: 26
Setting detail: THERAPIES SERIES
Discharge: HOME | End: 2021-03-10

## 2021-03-10 DIAGNOSIS — G44.86 CERVICOGENIC HEADACHE: ICD-10-CM

## 2021-03-10 DIAGNOSIS — M54.2 CERVICAL PAIN: Primary | ICD-10-CM

## 2021-03-10 DIAGNOSIS — G89.29 CHRONIC RIGHT SHOULDER PAIN: ICD-10-CM

## 2021-03-10 DIAGNOSIS — R20.2 PARESTHESIA OF BOTH HANDS: ICD-10-CM

## 2021-03-10 DIAGNOSIS — M25.511 CHRONIC RIGHT SHOULDER PAIN: ICD-10-CM

## 2021-03-10 PROCEDURE — 97110 THERAPEUTIC EXERCISES: CPT

## 2021-03-10 PROCEDURE — 97140 MANUAL THERAPY 1/> REGIONS: CPT

## 2021-03-10 NOTE — THERAPY TREATMENT NOTE
Outpatient Physical Therapy Ortho Treatment Note  [facility]     Patient Name: Dior Curry  : 1995  MRN: [id number]  Today's Date: 3/10/2021      Visit Date: 03/10/2021    Visit Dx:    ICD-10-CM ICD-9-CM   1. Cervical pain  M54.2 723.1   2. Cervicogenic headache  R51.9 784.0   3. Chronic right shoulder pain  M25.511 719.41    G89.29 338.29   4. Paresthesia of both hands  R20.2 782.0       Patient Active Problem List   Diagnosis   • Drug screening, pre-employment   • Irritable bowel syndrome with both constipation and diarrhea   • Abdominal cramping   • Family history of ulcerative colitis   • Moderate persistent asthma without complication   • Reactive hypoglycemia   • Gastroesophageal reflux disease without esophagitis   • Anxiety and depression   • PCOS (polycystic ovarian syndrome)   • Abnormal uterine bleeding (AUB)   • Hypoglycemia   • Migraines   • Pelvic pain in female   • Class 3 severe obesity due to excess calories with body mass index (BMI) of 40.0 to 44.9 in adult (CMS/HCC)   • Chronic neck pain   • Chronic hand pain   • Hypovitaminosis D        Past Medical History:   Diagnosis Date   • Asthma    • Chronic headaches    • Depression    • Eczema    • Gastritis    • GERD (gastroesophageal reflux disease)    • Hemorrhoids    • History of bronchitis    • History of pneumonia    • IBS (irritable bowel syndrome)    • Migraine    • Migraines    • PCOS (polycystic ovarian syndrome)         Past Surgical History:   Procedure Laterality Date   • COLONOSCOPY  approx     inflammation per patient   • COLONOSCOPY N/A 2018    Granular mucosa in TI , NBIH   • UPPER GASTROINTESTINAL ENDOSCOPY  approx     slow transit per patient   • WISDOM TOOTH EXTRACTION                         PT Assessment/Plan     Row Name 03/10/21 1639          PT Assessment    Assessment Comments  Ms. Curry returns for first follow up visit from initial evaluation with no change in symptoms or decrease in  frequency of migraines. Pt. states she has been consistent with performing, demonstrates good understanding of exercises in clinic. Performed manual to cervical/thoracic spine, notes feeling some improvement following. Tolerated progession of ther ex well, however, held on progessing HEP to assesses response following treatment.  -        PT Plan    PT Plan Comments  Update HEP if good tolerance; consider adding B ER over noodle, shoulder ext, c-spine isometrics  -       User Key  (r) = Recorded By, (t) = Taken By, (c) = Cosigned By    Initials Name Provider Type     Ana Nino, PT Physical Therapist            OP Exercises     Row Name 03/10/21 1600 03/10/21 1500          Subjective Comments    Subjective Comments  No real change, I have been doing my exercises  -  --        Total Minutes    73163 - PT Therapeutic Exercise Minutes  25  -  --     06355 - PT Manual Therapy Minutes  --  13  -        Exercise 1    Exercise Name 1  supine chin tuck  -  --     Cueing 1  Verbal;Tactile  -  --     Reps 1  10  -  --     Time 1  5s  -  --        Exercise 2    Exercise Name 2  post shoulder roll  -  --     Cueing 2  Verbal;Demo  -  --     Reps 2  10  -  --        Exercise 3    Exercise Name 3  levator scap  -  --     Cueing 3  Verbal;Demo  -  --     Reps 3  3  -  --     Time 3  20s  -  --        Exercise 4    Exercise Name 4  supine over noodle  -  --     Cueing 4  Verbal  -  --     Time 4  2 min  -  --        Exercise 5    Exercise Name 5  UBE  -  --     Cueing 5  Verbal  -  --     Time 5  4 min  -  --        Exercise 6    Exercise Name 6  alt flexion over noodle  -  --     Cueing 6  Verbal;Demo  -  --     Reps 6  10e  -  --        Exercise 7    Exercise Name 7  supine chin tuck + turn  -  --     Cueing 7  Verbal;Demo  -  --     Reps 7  10  -  --     Additional Comments  reset between reps  -  --        Exercise 8    Exercise Name 8  supine H-A  -  --      Cueing 8  Verbal;Demo  -  --     Reps 8  10  -  --     Additional Comments  RTB ; over noodle  -  --        Exercise 9    Exercise Name 9  seated rows  -  --     Cueing 9  Verbal;Demo  -  --     Reps 9  15  -  --     Additional Comments  RTB  -  --        Exercise 10    Exercise Name 10  doorway stretch  -  --     Cueing 10  Verbal;Demo  -  --     Reps 10  3  -  --     Time 10  20  -  --       User Key  (r) = Recorded By, (t) = Taken By, (c) = Cosigned By    Initials Name Provider Type     Ana Nino, PT Physical Therapist                      Manual Rx (last 36 hours)      Manual Treatments     Row Name 03/10/21 1500             Total Minutes    30873 - PT Manual Therapy Minutes  13  -         Manual Rx 1    Manual Rx 1 Location  prone thoracic PA mobs grade II-III  -      Manual Rx 1 Type  gentle STM periscapular region  -         Manual Rx 2    Manual Rx 2 Location  supine cx PA glides/sideglides grade II-III  -      Manual Rx 2 Type  gentle STM LS, UT, pecs  -        User Key  (r) = Recorded By, (t) = Taken By, (c) = Cosigned By    Initials Name Provider Type     Ana Nino, PT Physical Therapist          PT OP Goals     Row Name 03/10/21 1600          PT Short Term Goals    STG Date to Achieve  03/18/21  -     STG 1  The pt will report at least 40% reduction in frequency of R hand numbness using appropriate work and posture modifications.  -     STG 1 Progress  Ongoing  -        Long Term Goals    LTG Date to Achieve  04/26/21  -     LTG 1  The pt will demonstrate IND and compliant with HEP focused on return to PLOF and IND condition management.  -     LTG 1 Progress  Ongoing  [facility]     LTG 2  The pt will score less than 20% disability on the NDI to indicate improved perceived performance of ADLs.  -     LTG 2 Progress  Ongoing  [facility]     LTG 3  The pt will demonstrate L shoulder flexion AROM to at least 0-140 to facilitate improved functional reach  -      LTG 3 Progress  Ongoing  -     LTG 4  The pt will demonstrate B  strength to at least 40 pounds (average of 3 trials) to facilitate improved self care and recreational activity performance.  -     LTG 4 Progress  Ongoing  -     LTG 5  The pt will report no more than 6 total HA days per month to facilitate improved QOL and functional activity tolerance.  -     LTG 5 Progress  Ongoing  [facility]       User Key  (r) = Recorded By, (t) = Taken By, (c) = Cosigned By    Initials Name Provider Type     Ana Nino PT Physical Therapist          Therapy Education  Education Details: Continue HEP, monitor symptoms. If good tolerance will update next session  Given: Symptoms/condition management, Posture/body mechanics  Program: Reinforced  How Provided: Verbal, Demonstration  Provided to: Patient  Level of Understanding: Verbalized, Demonstrated              Time Calculation:   Start Time: 1419  Stop Time: 1457  Time Calculation (min): 38 min  Total Timed Code Minutes- PT: 38 minute(s)  Therapy Charges for Today     Code Description Service Date Service Provider Modifiers Qty    48978420849  PT MANUAL THERAPY EA 15 MIN 3/10/2021 Ana Nino, PT GP 1    [id number]  PT THER PROC EA 15 MIN 3/10/2021 Ana Nino, PT GP 2                    Ana Nino PT  3/10/2021

## 2021-03-12 ENCOUNTER — HOSPITAL ENCOUNTER (OUTPATIENT)
Dept: PHYSICAL THERAPY | Facility: HOSPITAL | Age: 26
Setting detail: THERAPIES SERIES
Discharge: HOME | End: 2021-03-12

## 2021-03-12 DIAGNOSIS — M54.2 CERVICAL PAIN: Primary | ICD-10-CM

## 2021-03-12 DIAGNOSIS — R20.2 PARESTHESIA OF BOTH HANDS: ICD-10-CM

## 2021-03-12 DIAGNOSIS — M25.511 CHRONIC RIGHT SHOULDER PAIN: ICD-10-CM

## 2021-03-12 DIAGNOSIS — G89.29 CHRONIC RIGHT SHOULDER PAIN: ICD-10-CM

## 2021-03-12 DIAGNOSIS — G44.86 CERVICOGENIC HEADACHE: ICD-10-CM

## 2021-03-12 PROCEDURE — 97140 MANUAL THERAPY 1/> REGIONS: CPT

## 2021-03-12 PROCEDURE — 97110 THERAPEUTIC EXERCISES: CPT

## 2021-03-12 NOTE — THERAPY TREATMENT NOTE
Outpatient Physical Therapy Ortho Treatment Note  [facility]     Patient Name: Dior Curry  : 1995  MRN: [id number]  Today's Date: 3/12/2021      Visit Date: 2021    Visit Dx:    ICD-10-CM ICD-9-CM   1. Cervical pain  M54.2 723.1   2. Cervicogenic headache  R51.9 784.0   3. Chronic right shoulder pain  M25.511 719.41    G89.29 338.29   4. Paresthesia of both hands  R20.2 782.0       Patient Active Problem List   Diagnosis   • Drug screening, pre-employment   • Irritable bowel syndrome with both constipation and diarrhea   • Abdominal cramping   • Family history of ulcerative colitis   • Moderate persistent asthma without complication   • Reactive hypoglycemia   • Gastroesophageal reflux disease without esophagitis   • Anxiety and depression   • PCOS (polycystic ovarian syndrome)   • Abnormal uterine bleeding (AUB)   • Hypoglycemia   • Migraines   • Pelvic pain in female   • Class 3 severe obesity due to excess calories with body mass index (BMI) of 40.0 to 44.9 in adult (CMS/HCC)   • Chronic neck pain   • Chronic hand pain   • Hypovitaminosis D        Past Medical History:   Diagnosis Date   • Asthma    • Chronic headaches    • Depression    • Eczema    • Gastritis    • GERD (gastroesophageal reflux disease)    • Hemorrhoids    • History of bronchitis    • History of pneumonia    • IBS (irritable bowel syndrome)    • Migraine    • Migraines    • PCOS (polycystic ovarian syndrome)         Past Surgical History:   Procedure Laterality Date   • COLONOSCOPY  approx     inflammation per patient   • COLONOSCOPY N/A 2018    Granular mucosa in TI , NBIH   • UPPER GASTROINTESTINAL ENDOSCOPY  approx     slow transit per patient   • WISDOM TOOTH EXTRACTION                         PT Assessment/Plan     Row Name 21 1700          PT Assessment    Assessment Comments  Ms. Curry arrives at 16:24 for 16:15 appointment, reports severe migraine yesterday but improved this date. Due to  arrival time limited ability to progress ther ex, however, did add serratus punch over noodle and seated shoulder ext. Pt. tolerated sesion well, reports slight pinching in scapula following but discussed likely muscular related due to exercises focused on postural strengthening.  -        PT Plan    PT Plan Comments  Consider B ER over noodle, c-spine isometrics, Q-ped chin tuck?  -       User Key  (r) = Recorded By, (t) = Taken By, (c) = Cosigned By    Initials Name Provider Type     Ana Nino, PT Physical Therapist            OP Exercises     Row Name 03/12/21 1600 03/12/21 1500          Subjective Comments    Subjective Comments  Pt. arrival 16:24 for 16:15 appointment  -  --        Subjective Pain    Able to rate subjective pain?  yes  -  --     Subjective Pain Comment  It is better today, yesterday I had a really bad migraine  -  --        Total Minutes    89611 - PT Therapeutic Exercise Minutes  20  -MH  --     45224 - PT Manual Therapy Minutes  --  10  -        Exercise 1    Exercise Name 1  supine chin tuck  -  --     Cueing 1  Verbal;Tactile  -  --     Reps 1  10  -  --     Time 1  5s  -  --        Exercise 2    Exercise Name 2  post shoulder roll  -  --     Cueing 2  Verbal;Demo  -  --     Reps 2  10  -  --        Exercise 3    Exercise Name 3  levator scap  -  --     Cueing 3  Verbal;Demo  -  --     Reps 3  3  -  --     Time 3  20s  -  --        Exercise 4    Exercise Name 4  supine over noodle  -  --     Cueing 4  Verbal  -  --     Time 4  2 min  -  --        Exercise 5    Exercise Name 5  UBE  -  --     Cueing 5  Verbal  -  --     Time 5  4 min  -  --        Exercise 6    Exercise Name 6  alt flexion over noodle  -  --     Cueing 6  Verbal;Demo  -  --     Reps 6  10e  -  --        Exercise 7    Exercise Name 7  supine chin tuck + turn  -  --     Cueing 7  Verbal;Demo  -  --     Reps 7  10  -MH  --     Additional Comments  reset between  reps  -MH  --        Exercise 8    Exercise Name 8  supine H-A  -MH  --     Cueing 8  Verbal;Demo  -MH  --     Reps 8  15  -MH  --     Additional Comments  RTB; over noodle  -  --        Exercise 9    Exercise Name 9  seated rows  -MH  --     Cueing 9  Verbal;Demo  -  --     Sets 9  2  -MH  --     Reps 9  10  -MH  --     Additional Comments  RTB  -MH  --        Exercise 10    Exercise Name 10  doorway stretch  -MH  --     Cueing 10  Verbal;Demo  -MH  --     Reps 10  3  -MH  --     Time 10  20  -MH  --        Exercise 11    Exercise Name 11  supine SAP over noodle  -MH  --     Cueing 11  Verbal  -MH  --     Reps 11  10  -MH  --        Exercise 12    Exercise Name 12  seated shoulder ext  -MH  --     Cueing 12  Verbal  -  --     Reps 12  10  -MH  --     Additional Comments  RTB  -  --       User Key  (r) = Recorded By, (t) = Taken By, (c) = Cosigned By    Initials Name Provider Type     Ana Nino, PT Physical Therapist                      Manual Rx (last 36 hours)      Manual Treatments     Row Name 03/12/21 1500             Total Minutes    10653 - PT Manual Therapy Minutes  10  -         Manual Rx 1    Manual Rx 1 Location  --  -      Manual Rx 1 Type  --  -         Manual Rx 2    Manual Rx 2 Location  supine cx PA glides/sideglides grade II-III  -      Manual Rx 2 Type  gentle STM LS, UT, pecs  -        User Key  (r) = Recorded By, (t) = Taken By, (c) = Cosigned By    Initials Name Provider Type     Ana Nino, PT Physical Therapist          PT OP Goals     Row Name 03/12/21 1700          PT Short Term Goals    STG Date to Achieve  03/18/21  -     STG 1  The pt will report at least 40% reduction in frequency of R hand numbness using appropriate work and posture modifications.  -     STG 1 Progress  Ongoing  -        Long Term Goals    LTG Date to Achieve  04/26/21  -     LTG 1  The pt will demonstrate IND and compliant with HEP focused on return to PLOF and IND condition  management.  -     LTG 1 Progress  Ongoing  -     LTG 2  The pt will score less than 20% disability on the NDI to indicate improved perceived performance of ADLs.  -     LTG 2 Progress  Ongoing  -     LTG 3  The pt will demonstrate L shoulder flexion AROM to at least 0-140 to facilitate improved functional reach  -     LTG 3 Progress  Ongoing  -     LTG 4  The pt will demonstrate B  strength to at least 40 pounds (average of 3 trials) to facilitate improved self care and recreational activity performance.  -     LTG 4 Progress  Ongoing  -     LTG 5  The pt will report no more than 6 total HA days per month to facilitate improved QOL and functional activity tolerance.  -     LTG 5 Progress  Ongoing  -       User Key  (r) = Recorded By, (t) = Taken By, (c) = Cosigned By    Initials Name Provider Type    Ana Mercer PT Physical Therapist          Therapy Education  Given: Symptoms/condition management, Posture/body mechanics  Program: Reinforced  How Provided: Verbal, Demonstration  Provided to: Patient  Level of Understanding: Verbalized, Demonstrated              Time Calculation:   Start Time: 1624 (Pt. arrival 16:24 for 16:15 appointment)  Stop Time: 1658  Time Calculation (min): 34 min  Total Timed Code Minutes- PT: 30 minute(s)  Therapy Charges for Today     Code Description Service Date Service Provider Modifiers Qty    [id number]  PT MANUAL THERAPY EA 15 MIN 3/12/2021 Ana Nino, PT GP 1    [id number]  PT THER PROC EA 15 MIN 3/12/2021 Ana Nino, PT GP 1                    Ana Nino PT  3/12/2021

## 2021-03-15 ENCOUNTER — HOSPITAL ENCOUNTER (OUTPATIENT)
Dept: PHYSICAL THERAPY | Facility: HOSPITAL | Age: 26
Setting detail: THERAPIES SERIES
Discharge: HOME | End: 2021-03-15

## 2021-03-15 DIAGNOSIS — G89.29 CHRONIC RIGHT SHOULDER PAIN: ICD-10-CM

## 2021-03-15 DIAGNOSIS — R20.2 PARESTHESIA OF BOTH HANDS: ICD-10-CM

## 2021-03-15 DIAGNOSIS — M54.2 CERVICAL PAIN: Primary | ICD-10-CM

## 2021-03-15 DIAGNOSIS — G44.86 CERVICOGENIC HEADACHE: ICD-10-CM

## 2021-03-15 DIAGNOSIS — M25.511 CHRONIC RIGHT SHOULDER PAIN: ICD-10-CM

## 2021-03-15 PROCEDURE — 97110 THERAPEUTIC EXERCISES: CPT

## 2021-03-15 PROCEDURE — 97140 MANUAL THERAPY 1/> REGIONS: CPT

## 2021-03-15 NOTE — THERAPY TREATMENT NOTE
Outpatient Physical Therapy Ortho Treatment Note  [facility]     Patient Name: Dior Curry  : 1995  MRN: [id number]  Today's Date: 3/15/2021      Visit Date: 03/15/2021    Visit Dx:    ICD-10-CM ICD-9-CM   1. Cervical pain  M54.2 723.1   2. Cervicogenic headache  R51.9 784.0   3. Chronic right shoulder pain  M25.511 719.41    G89.29 338.29   4. Paresthesia of both hands  R20.2 782.0       Patient Active Problem List   Diagnosis   • Drug screening, pre-employment   • Irritable bowel syndrome with both constipation and diarrhea   • Abdominal cramping   • Family history of ulcerative colitis   • Moderate persistent asthma without complication   • Reactive hypoglycemia   • Gastroesophageal reflux disease without esophagitis   • Anxiety and depression   • PCOS (polycystic ovarian syndrome)   • Abnormal uterine bleeding (AUB)   • Hypoglycemia   • Migraines   • Pelvic pain in female   • Class 3 severe obesity due to excess calories with body mass index (BMI) of 40.0 to 44.9 in adult (CMS/HCC)   • Chronic neck pain   • Chronic hand pain   • Hypovitaminosis D        Past Medical History:   Diagnosis Date   • Asthma    • Chronic headaches    • Depression    • Eczema    • Gastritis    • GERD (gastroesophageal reflux disease)    • Hemorrhoids    • History of bronchitis    • History of pneumonia    • IBS (irritable bowel syndrome)    • Migraine    • Migraines    • PCOS (polycystic ovarian syndrome)         Past Surgical History:   Procedure Laterality Date   • COLONOSCOPY  approx     inflammation per patient   • COLONOSCOPY N/A 2018    Granular mucosa in TI , NBIH   • UPPER GASTROINTESTINAL ENDOSCOPY  approx     slow transit per patient   • WISDOM TOOTH EXTRACTION                         PT Assessment/Plan     Row Name 03/15/21 1600          PT Assessment    Assessment Comments  Dior arrives at 16:24 for 16:15 appt due to heavy traffic. Limited progression today although did add RTC  strengthening and thoraic mobility. Pt does feel she is improving since start of therapy. Continues to have sharp anterior shoulder pain with fwd shldr elevation.   -CA        PT Plan    PT Plan Comments  cont postural work, consider high row  -CA       User Key  (r) = Recorded By, (t) = Taken By, (c) = Cosigned By    Initials Name Provider Type    Jessica Gordillo, PT Physical Therapist            OP Exercises     Row Name 03/15/21 1600 03/15/21 1500          Subjective Comments    Subjective Comments  Pt arrives at 16:24 for 16:15 appt. Reports bad traffic   -CA  --        Subjective Pain    Able to rate subjective pain?  yes  -CA  --     Pre-Treatment Pain Level  4  -CA  --        Total Minutes    78453 - PT Therapeutic Exercise Minutes  20  -CA  --     72362 - PT Manual Therapy Minutes  --  10  -CA        Exercise 1    Exercise Name 1  supine chin tuck  -CA  --     Cueing 1  Verbal;Tactile  -CA  --     Reps 1  10  -CA  --     Time 1  5s  -CA  --        Exercise 2    Exercise Name 2  supine B ER over noodle  -CA  --     Cueing 2  Verbal  -CA  --     Reps 2  10   -CA  --     Time 2  rtb  -CA  --        Exercise 3    Exercise Name 3  levator scap  -CA  --     Cueing 3  Verbal;Demo  -CA  --     Reps 3  3  -CA  --     Time 3  20s  -CA  --        Exercise 4    Exercise Name 4  supine over noodle  -CA  --     Cueing 4  Verbal  -CA  --     Time 4  2 min  -CA  --        Exercise 5    Exercise Name 5  UBE  -CA  --     Cueing 5  Verbal  -CA  --     Time 5  4 min  -CA  --        Exercise 6    Exercise Name 6  alt flexion over noodle  -CA  --     Cueing 6  Verbal;Demo  -CA  --     Reps 6  10e  -CA  --        Exercise 7    Exercise Name 7  supine chin tuck + turn  -CA  --     Cueing 7  Verbal;Demo  -CA  --     Reps 7  10  -CA  --     Additional Comments  reset between reps  -CA  --        Exercise 8    Exercise Name 8  supine H-A  -CA  --     Cueing 8  Verbal;Demo  -CA  --     Reps 8  15  -CA  --     Additional Comments   RTB; over noodle  -CA  --        Exercise 13    Exercise Name 13  S/L thoracic arc  -CA  --     Cueing 13  Verbal  -CA  --     Reps 13  10   -CA  --     Time 13  5s  -CA  --       User Key  (r) = Recorded By, (t) = Taken By, (c) = Cosigned By    Initials Name Provider Type    Jessica Gordillo, PT Physical Therapist                      Manual Rx (last 36 hours)      Manual Treatments     Row Name 03/15/21 1500             Total Minutes    39910 - PT Manual Therapy Minutes  10  -CA         Manual Rx 2    Manual Rx 2 Location  supine cx PA glides/sideglides grade II-III  -CA      Manual Rx 2 Type  gentle STM LS, UT, pecs  -CA        User Key  (r) = Recorded By, (t) = Taken By, (c) = Cosigned By    Initials Name Provider Type    Jessica Gordillo, PT Physical Therapist          PT OP Goals     Row Name 03/15/21 1600          PT Short Term Goals    STG Date to Achieve  03/18/21  -CA     STG 1  The pt will report at least 40% reduction in frequency of R hand numbness using appropriate work and posture modifications.  -CA     STG 1 Progress  Ongoing  -CA        Long Term Goals    LTG Date to Achieve  04/26/21  -CA     LTG 1  The pt will demonstrate IND and compliant with HEP focused on return to PLOF and IND condition management.  -CA     LTG 1 Progress  Ongoing  -CA     LTG 2  The pt will score less than 20% disability on the NDI to indicate improved perceived performance of ADLs.  -CA     LTG 2 Progress  Ongoing  -CA     LTG 3  The pt will demonstrate L shoulder flexion AROM to at least 0-140 to facilitate improved functional reach  -CA     LTG 3 Progress  Ongoing  -CA     LTG 4  The pt will demonstrate B  strength to at least 40 pounds (average of 3 trials) to facilitate improved self care and recreational activity performance.  -CA     LTG 4 Progress  Ongoing  -CA     LTG 5  The pt will report no more than 6 total HA days per month to facilitate improved QOL and functional activity tolerance.  -CA     LTG 5  Progress  Ongoing  -CA       User Key  (r) = Recorded By, (t) = Taken By, (c) = Cosigned By    Initials Name Provider Type    Jessica Gordillo, PT Physical Therapist                         Time Calculation:   Start Time: 1624  Stop Time: 1654  Time Calculation (min): 30 min  Total Timed Code Minutes- PT: 30 minute(s)  Therapy Charges for Today     Code Description Service Date Service Provider Modifiers Qty    [id number]  PT THER PROC EA 15 MIN 3/15/2021 Jessica Baker, PT GP 1    [id number]  PT MANUAL THERAPY EA 15 MIN 3/15/2021 Jessica Baker, PT GP 1                    Jessica Baker PT  3/15/2021

## 2021-03-17 ENCOUNTER — HOSPITAL ENCOUNTER (OUTPATIENT)
Dept: PHYSICAL THERAPY | Facility: HOSPITAL | Age: 26
Setting detail: THERAPIES SERIES
Discharge: HOME | End: 2021-03-17

## 2021-03-17 DIAGNOSIS — M54.2 CERVICAL PAIN: Primary | ICD-10-CM

## 2021-03-17 DIAGNOSIS — G44.86 CERVICOGENIC HEADACHE: ICD-10-CM

## 2021-03-17 PROCEDURE — 97140 MANUAL THERAPY 1/> REGIONS: CPT

## 2021-03-17 PROCEDURE — 97110 THERAPEUTIC EXERCISES: CPT

## 2021-03-17 NOTE — THERAPY TREATMENT NOTE
Outpatient Physical Therapy Ortho Treatment Note  [facility]     Patient Name: Dior Curry  : 1995  MRN: [id number]  Today's Date: 3/17/2021      Visit Date: 2021    Visit Dx:    ICD-10-CM ICD-9-CM   1. Cervical pain  M54.2 723.1   2. Cervicogenic headache  R51.9 784.0       Patient Active Problem List   Diagnosis   • Drug screening, pre-employment   • Irritable bowel syndrome with both constipation and diarrhea   • Abdominal cramping   • Family history of ulcerative colitis   • Moderate persistent asthma without complication   • Reactive hypoglycemia   • Gastroesophageal reflux disease without esophagitis   • Anxiety and depression   • PCOS (polycystic ovarian syndrome)   • Abnormal uterine bleeding (AUB)   • Hypoglycemia   • Migraines   • Pelvic pain in female   • Class 3 severe obesity due to excess calories with body mass index (BMI) of 40.0 to 44.9 in adult (CMS/HCC)   • Chronic neck pain   • Chronic hand pain   • Hypovitaminosis D        Past Medical History:   Diagnosis Date   • Asthma    • Chronic headaches    • Depression    • Eczema    • Gastritis    • GERD (gastroesophageal reflux disease)    • Hemorrhoids    • History of bronchitis    • History of pneumonia    • IBS (irritable bowel syndrome)    • Migraine    • Migraines    • PCOS (polycystic ovarian syndrome)         Past Surgical History:   Procedure Laterality Date   • COLONOSCOPY  approx     inflammation per patient   • COLONOSCOPY N/A 2018    Granular mucosa in TI , NBIH   • UPPER GASTROINTESTINAL ENDOSCOPY  approx     slow transit per patient   • WISDOM TOOTH EXTRACTION         PT Ortho     Row Name 21 1600       Cervical/Thoracic Special Tests    Milton Test (TOS)  Negative  -CA    Brenda's Test (TOS)  Negative  -CA      User Key  (r) = Recorded By, (t) = Taken By, (c) = Cosigned By    Initials Name Provider Type    Jessica Gordillo, PT Physical Therapist                      PT Assessment/Plan     Row  Name 03/17/21 1600          PT Assessment    Assessment Comments  Dior returns today reporting B hands have been bothering her over the past few days (R>L). She reports holding objects increased pain and fatigue in her hands, and if she is in sidelying some numbness in hands. TOS testing (-) today, pts pain seems to be reproduced with  and thumb ROM/movement. We discussed f/u with PCP for referral to hand specialist as hand pain seems to be separate issue from the neck. Pt reports improved neck pain at end of session today.  -CA        PT Plan    PT Plan Comments  f/u re: call back to PCP  -CA       User Key  (r) = Recorded By, (t) = Taken By, (c) = Cosigned By    Initials Name Provider Type    Jessica Gordillo, PT Physical Therapist            OP Exercises     Row Name 03/17/21 1600 03/17/21 1500          Subjective Comments    Subjective Comments  Arrives at 16:18 for 16:15 appt. Reports incr R hand pain today.  -CA  --        Subjective Pain    Able to rate subjective pain?  yes  -CA  --     Pre-Treatment Pain Level  8  -CA  --     Subjective Pain Comment  in R hand  -CA  --        Total Minutes    60889 - PT Therapeutic Exercise Minutes  15  -CA  --     42119 - PT Manual Therapy Minutes  --  25  -CA        Exercise 1    Exercise Name 1  supine chin tuck  -CA  --     Cueing 1  Verbal;Tactile  -CA  --     Reps 1  10  -CA  --     Time 1  5s  -CA  --        Exercise 2    Exercise Name 2  supine B ER over noodle  -CA  --     Cueing 2  Verbal  -CA  --     Reps 2  10   -CA  --     Time 2  rtb  -CA  --        Exercise 3    Exercise Name 3  levator scap  -CA  --     Cueing 3  Verbal;Demo  -CA  --     Reps 3  3  -CA  --     Time 3  20s  -CA  --     Additional Comments  arm behind back  -CA  --        Exercise 5    Exercise Name 5  UBE  -CA  --     Cueing 5  Verbal  -CA  --     Time 5  4 min  -CA  --        Exercise 7    Exercise Name 7  supine chin tuck + turn  -CA  --     Cueing 7  Verbal;Demo  -CA  --     Reps  7  10  -CA  --        Exercise 8    Exercise Name 8  supine H-A  -CA  --     Cueing 8  Verbal;Demo  -CA  --     Sets 8  2  -CA  --     Reps 8  10  -CA  --     Additional Comments  RTb no noodle  -CA  --        Exercise 13    Exercise Name 13  S/L thoracic arc  -CA  --     Cueing 13  Verbal  -CA  --     Reps 13  10   -CA  --     Time 13  5s  -CA  --       User Key  (r) = Recorded By, (t) = Taken By, (c) = Cosigned By    Initials Name Provider Type    Jessica Gordillo, PT Physical Therapist                      Manual Rx (last 36 hours)      Manual Treatments     Row Name 03/17/21 1500             Total Minutes    48560 - PT Manual Therapy Minutes  25  -CA         Manual Rx 2    Manual Rx 2 Location  supine cx PA glides/sideglides grade II-III  -CA      Manual Rx 2 Type  gentle STM LS, UT, pecs  -CA         Manual Rx 3    Manual Rx 3 Location  R median n glide  -CA        User Key  (r) = Recorded By, (t) = Taken By, (c) = Cosigned By    Initials Name Provider Type    Jessica Gordillo, PT Physical Therapist          PT OP Goals     Row Name 03/17/21 1700          PT Short Term Goals    STG Date to Achieve  03/18/21  -CA     STG 1  The pt will report at least 40% reduction in frequency of R hand numbness using appropriate work and posture modifications.  -CA     STG 1 Progress  Ongoing  -CA        Long Term Goals    LTG Date to Achieve  04/26/21  -CA     LTG 1  The pt will demonstrate IND and compliant with HEP focused on return to PLOF and IND condition management.  -CA     LTG 1 Progress  Ongoing  -CA     LTG 2  The pt will score less than 20% disability on the NDI to indicate improved perceived performance of ADLs.  -CA     LTG 2 Progress  Ongoing  -CA     LTG 3  The pt will demonstrate L shoulder flexion AROM to at least 0-140 to facilitate improved functional reach  -CA     LTG 3 Progress  Ongoing  -CA     LTG 4  The pt will demonstrate B  strength to at least 40 pounds (average of 3 trials) to facilitate  improved self care and recreational activity performance.  -CA     LTG 4 Progress  Ongoing  -CA     LTG 5  The pt will report no more than 6 total HA days per month to facilitate improved QOL and functional activity tolerance.  -CA     LTG 5 Progress  Ongoing  -CA       User Key  (r) = Recorded By, (t) = Taken By, (c) = Cosigned By    Initials Name Provider Type    Jsesica Gordillo, PT Physical Therapist                         Time Calculation:   Start Time: 1620  Stop Time: 1700  Time Calculation (min): 40 min  Total Timed Code Minutes- PT: 40 minute(s)  Therapy Charges for Today     Code Description Service Date Service Provider Modifiers Qty    [id number]  PT THER PROC EA 15 MIN 3/17/2021 Jessica Baker, PT GP 1    [id number]  PT MANUAL THERAPY EA 15 MIN 3/17/2021 Jessica Baker, PT GP 2                    Jessica Baker PT  3/17/2021

## 2021-03-19 DIAGNOSIS — M79.643 PAIN OF HAND, UNSPECIFIED LATERALITY: Primary | ICD-10-CM

## 2021-03-22 ENCOUNTER — APPOINTMENT (OUTPATIENT)
Dept: PHYSICAL THERAPY | Facility: HOSPITAL | Age: 26
End: 2021-03-22

## 2021-03-25 ENCOUNTER — HOSPITAL ENCOUNTER (OUTPATIENT)
Dept: PHYSICAL THERAPY | Facility: HOSPITAL | Age: 26
Setting detail: THERAPIES SERIES
Discharge: HOME | End: 2021-03-25

## 2021-03-25 DIAGNOSIS — M25.511 CHRONIC RIGHT SHOULDER PAIN: ICD-10-CM

## 2021-03-25 DIAGNOSIS — R20.2 PARESTHESIA OF BOTH HANDS: ICD-10-CM

## 2021-03-25 DIAGNOSIS — G44.86 CERVICOGENIC HEADACHE: ICD-10-CM

## 2021-03-25 DIAGNOSIS — G89.29 CHRONIC RIGHT SHOULDER PAIN: ICD-10-CM

## 2021-03-25 DIAGNOSIS — M54.2 CERVICAL PAIN: Primary | ICD-10-CM

## 2021-03-25 PROCEDURE — 97110 THERAPEUTIC EXERCISES: CPT

## 2021-03-25 PROCEDURE — 97140 MANUAL THERAPY 1/> REGIONS: CPT

## 2021-03-25 NOTE — THERAPY TREATMENT NOTE
Outpatient Physical Therapy Ortho Treatment Note  [facility]     Patient Name: Dior Curry  : 1995  MRN: [id number]  Today's Date: 3/25/2021      Visit Date: 2021    Visit Dx:    ICD-10-CM ICD-9-CM   1. Cervical pain  M54.2 723.1   2. Cervicogenic headache  R51.9 784.0   3. Chronic right shoulder pain  M25.511 719.41    G89.29 338.29   4. Paresthesia of both hands  R20.2 782.0       Patient Active Problem List   Diagnosis   • Drug screening, pre-employment   • Irritable bowel syndrome with both constipation and diarrhea   • Abdominal cramping   • Family history of ulcerative colitis   • Moderate persistent asthma without complication   • Reactive hypoglycemia   • Gastroesophageal reflux disease without esophagitis   • Anxiety and depression   • PCOS (polycystic ovarian syndrome)   • Abnormal uterine bleeding (AUB)   • Hypoglycemia   • Migraines   • Pelvic pain in female   • Class 3 severe obesity due to excess calories with body mass index (BMI) of 40.0 to 44.9 in adult (CMS/HCC)   • Chronic neck pain   • Chronic hand pain   • Hypovitaminosis D        Past Medical History:   Diagnosis Date   • Asthma    • Chronic headaches    • Depression    • Eczema    • Gastritis    • GERD (gastroesophageal reflux disease)    • Hemorrhoids    • History of bronchitis    • History of pneumonia    • IBS (irritable bowel syndrome)    • Migraine    • Migraines    • PCOS (polycystic ovarian syndrome)         Past Surgical History:   Procedure Laterality Date   • COLONOSCOPY  approx     inflammation per patient   • COLONOSCOPY N/A 2018    Granular mucosa in TI , NBIH   • UPPER GASTROINTESTINAL ENDOSCOPY  approx     slow transit per patient   • WISDOM TOOTH EXTRACTION                         PT Assessment/Plan     Row Name 21 1600          PT Assessment    Assessment Comments  Dior returns with slight improvement in neck and LUE s/s. She reports she has referral for hand specialist who she  will see at end of April. Discussed possible d/c next week vs adding on several more visits at 1x per week.  -CA        PT Plan    PT Plan Comments  cont with standing exercises and update HEP. add standing median n glide  -CA       User Key  (r) = Recorded By, (t) = Taken By, (c) = Cosigned By    Initials Name Provider Type    Jessica Gordillo, PT Physical Therapist            OP Exercises     Row Name 03/25/21 1600 03/25/21 1500          Subjective Comments    Subjective Comments  Doing a lot better today. I got a referral for my hand, I see them in about a month  -CA  --        Subjective Pain    Able to rate subjective pain?  yes  -CA  --     Pre-Treatment Pain Level  3  -CA  --        Total Minutes    68251 - PT Therapeutic Exercise Minutes  23  -CA  --     71426 - PT Manual Therapy Minutes  --  15  -CA        Exercise 2    Exercise Name 2  standing B ER  -CA  --     Cueing 2  Verbal  -CA  --     Sets 2  2  -CA  --     Reps 2  10  -CA  --     Time 2  RTB  -CA  --        Exercise 3    Exercise Name 3  levator scap  -CA  --     Cueing 3  Verbal;Demo  -CA  --     Reps 3  3  -CA  --     Time 3  20s  -CA  --     Additional Comments  arm behind back  -CA  --        Exercise 4    Exercise Name 4  wall wash flex for tspine ext  -CA  --     Cueing 4  Verbal  -CA  --     Reps 4  10  -CA  --     Time 4  5s  -CA  --     Additional Comments  cues for chin tuck  -CA  --        Exercise 5    Exercise Name 5  UBE  -CA  --     Cueing 5  Verbal  -CA  --     Time 5  4 min  -CA  --        Exercise 6    Exercise Name 6  standing star (HA + diagonals)  -CA  --     Cueing 6  Verbal  -CA  --     Reps 6  15  -CA  --     Time 6  RTB  -CA  --     Additional Comments  some UT pain at end of session  -CA  --        Exercise 7    Exercise Name 7  standing scap depression to shrug with arm abducted at about 30 deg  -CA  --     Cueing 7  Verbal  -CA  --     Reps 7  15 L  -CA  --        Exercise 9    Exercise Name 9  row  -CA  --     Cueing  9  Verbal  -CA  --     Sets 9  2  -CA  --     Reps 9  10  -CA  --     Time 9  RTB  -CA  --        Exercise 11    Exercise Name 11  tband shldr ext  -CA  --     Cueing 11  Verbal  -CA  --     Sets 11  2  -CA  --     Reps 11  10  -CA  --     Time 11  RTB  -CA  --        Exercise 12    Exercise Name 12  wall push up plus  -CA  --     Cueing 12  Verbal  -CA  --     Reps 12  2x10  -CA  --       User Key  (r) = Recorded By, (t) = Taken By, (c) = Cosigned By    Initials Name Provider Type    Jessica Gordillo, PT Physical Therapist                      Manual Rx (last 36 hours)      Manual Treatments     Row Name 03/25/21 1500             Total Minutes    26731 - PT Manual Therapy Minutes  15  -CA         Manual Rx 2    Manual Rx 2 Location  supine cx PA glides/sideglides grade II-III  -CA      Manual Rx 2 Type  gentle STM LS, UT, pecs  -CA         Manual Rx 4    Manual Rx 4 Location  L shoulder   -CA      Manual Rx 4 Type  functional IR MWM with cervical flex, lat flex, and rot  -CA        User Key  (r) = Recorded By, (t) = Taken By, (c) = Cosigned By    Initials Name Provider Type    Jessica Gordillo, PT Physical Therapist          PT OP Goals     Row Name 03/25/21 1600          PT Short Term Goals    STG Date to Achieve  03/18/21  -CA     STG 1  The pt will report at least 40% reduction in frequency of R hand numbness using appropriate work and posture modifications.  -CA     STG 1 Progress  Ongoing  -CA        Long Term Goals    LTG Date to Achieve  04/26/21  -CA     LTG 1  The pt will demonstrate IND and compliant with HEP focused on return to PLOF and IND condition management.  -CA     LTG 1 Progress  Ongoing  -CA     LTG 2  The pt will score less than 20% disability on the NDI to indicate improved perceived performance of ADLs.  -CA     LTG 2 Progress  Ongoing  -CA     LTG 3  The pt will demonstrate L shoulder flexion AROM to at least 0-140 to facilitate improved functional reach  -CA     LTG 3 Progress   Ongoing  -CA     LTG 4  The pt will demonstrate B  strength to at least 40 pounds (average of 3 trials) to facilitate improved self care and recreational activity performance.  -CA     LTG 4 Progress  Ongoing  -CA     LTG 5  The pt will report no more than 6 total HA days per month to facilitate improved QOL and functional activity tolerance.  -CA     LTG 5 Progress  Met  -CA     LTG 5 Progress Comments  hasnt had one in at least a week  -CA       User Key  (r) = Recorded By, (t) = Taken By, (c) = Cosigned By    Initials Name Provider Type    Jessica Gordillo, PT Physical Therapist                         Time Calculation:   Start Time: 1605  Stop Time: 1643  Time Calculation (min): 38 min  Total Timed Code Minutes- PT: 38 minute(s)  Therapy Charges for Today     Code Description Service Date Service Provider Modifiers Qty    48654298315  PT THER PROC EA 15 MIN 3/25/2021 Jessica Baker, PT GP 2    [id number]  PT MANUAL THERAPY EA 15 MIN 3/25/2021 Jessica Baker, PT GP 1                    Jessica Baker PT  3/25/2021

## 2021-03-26 ENCOUNTER — OFFICE VISIT (OUTPATIENT)
Dept: GASTROENTEROLOGY | Facility: CLINIC | Age: 26
End: 2021-03-26

## 2021-03-26 VITALS — BODY MASS INDEX: 44.05 KG/M2 | WEIGHT: 239.4 LBS | TEMPERATURE: 98.4 F | HEIGHT: 62 IN

## 2021-03-26 DIAGNOSIS — K58.2 IRRITABLE BOWEL SYNDROME WITH BOTH CONSTIPATION AND DIARRHEA: Primary | Chronic | ICD-10-CM

## 2021-03-26 DIAGNOSIS — R10.9 ABDOMINAL CRAMPING: ICD-10-CM

## 2021-03-26 DIAGNOSIS — K21.9 GASTROESOPHAGEAL REFLUX DISEASE WITHOUT ESOPHAGITIS: Chronic | ICD-10-CM

## 2021-03-26 PROCEDURE — 99214 OFFICE O/P EST MOD 30 MIN: CPT | Performed by: NURSE PRACTITIONER

## 2021-03-26 NOTE — PROGRESS NOTES
Chief Complaint   Patient presents with   • Irritable Bowel Syndrome   • Heartburn       Dior Curry is a  26 y.o. female here for a follow up visit for IBS.    HPI  26-year-old female presents today for follow-up visit for IBS-mixed.  She is a patient of Dr. Curry.  She was last seen in the office by me on 1/5/2021.  She has a history of IBS type and mixed and normally does really well and just controls her symptoms with Levsin.  But unfortunately she tells me several weeks ago she had an IUD placed by her gynecologist and ever since that she has been pretty miserable.  She tells me since getting the IUD placed her periods are heavy and have a lot of bleeding with cramping and nausea.  She is not sure if that is aggravating her IBS and causing all the diarrhea and the gas and the bloating or if it is even the new migraine injection she has started about the same time.  She tells me she has been under a lot of stress with all of this so she is not sure if maybe that is even causing problems.  She tells me normally the Levsin works to help with the cramping but it is not working.  She has had to take a lot of NSAIDs due to the heavy periods and the cramping and now she is worried maybe the NSAIDs could be making things worse.  She denies any recent antibiotics.  She denies any changes in her diet.  She does have a history of GERD and admits she normally does well on Prevacid 30 mg twice daily and Pepcid 40 mg twice daily.  She denies any breakthrough reflux right now.  She did have a gallbladder ultrasound done recently that showed fatty liver disease.  Most recent LFTs were normal.  Last EGD was in 2015.  Last colonoscopy was in 2018.  Past Medical History:   Diagnosis Date   • Asthma    • Chronic headaches    • Depression    • Eczema    • Gastritis    • GERD (gastroesophageal reflux disease)    • Hemorrhoids    • History of bronchitis    • History of pneumonia    • IBS (irritable bowel syndrome)    • Migraine     • Migraines    • PCOS (polycystic ovarian syndrome)        Past Surgical History:   Procedure Laterality Date   • COLONOSCOPY  approx 2010    inflammation per patient   • COLONOSCOPY N/A 11/7/2018    Granular mucosa in TI , NBIH   • UPPER GASTROINTESTINAL ENDOSCOPY  approx 2015    slow transit per patient   • WISDOM TOOTH EXTRACTION         Scheduled Meds:    Continuous Infusions:No current facility-administered medications for this visit.      PRN Meds:.    No Known Allergies    Social History     Socioeconomic History   • Marital status: Significant Other     Spouse name: Not on file   • Number of children: Not on file   • Years of education: Not on file   • Highest education level: Not on file   Tobacco Use   • Smoking status: Never Smoker   • Smokeless tobacco: Never Used   Substance and Sexual Activity   • Alcohol use: Yes     Comment: very seldom   • Drug use: No   • Sexual activity: Yes     Birth control/protection: Pill       Family History   Problem Relation Age of Onset   • Ulcerative colitis Mother    • Fibromyalgia Mother    • Rheum arthritis Mother    • Hypertension Mother    • Diabetes type II Father    • Hypertension Father    • Malig Hyperthermia Neg Hx        Review of Systems   Constitutional: Negative for appetite change, chills, diaphoresis, fatigue, fever and unexpected weight change.   HENT: Negative for nosebleeds, postnasal drip, sore throat, trouble swallowing and voice change.    Respiratory: Negative for cough, choking, chest tightness, shortness of breath and wheezing.    Cardiovascular: Negative for chest pain, palpitations and leg swelling.   Gastrointestinal: Positive for abdominal distention, abdominal pain and diarrhea. Negative for anal bleeding, blood in stool, constipation, nausea, rectal pain and vomiting.   Endocrine: Negative for polydipsia, polyphagia and polyuria.   Musculoskeletal: Negative for gait problem.   Skin: Negative for rash and wound.   Allergic/Immunologic:  Negative for food allergies.   Neurological: Negative for dizziness, speech difficulty and light-headedness.   Psychiatric/Behavioral: Negative for confusion, self-injury, sleep disturbance and suicidal ideas.       Vitals:    03/26/21 1430   Temp: 98.4 °F (36.9 °C)       Physical Exam  Constitutional:       General: She is not in acute distress.     Appearance: She is well-developed. She is not ill-appearing.   HENT:      Head: Normocephalic.   Eyes:      Pupils: Pupils are equal, round, and reactive to light.   Cardiovascular:      Rate and Rhythm: Normal rate and regular rhythm.      Heart sounds: Normal heart sounds.   Pulmonary:      Effort: Pulmonary effort is normal.      Breath sounds: Normal breath sounds.   Abdominal:      General: Bowel sounds are normal. There is distension.      Palpations: Abdomen is soft. There is no mass.      Tenderness: There is no abdominal tenderness. There is no guarding or rebound.      Hernia: No hernia is present.   Musculoskeletal:         General: Normal range of motion.   Skin:     General: Skin is warm and dry.   Neurological:      Mental Status: She is alert and oriented to person, place, and time.   Psychiatric:         Speech: Speech normal.         Behavior: Behavior normal.         Judgment: Judgment normal.         No radiology results for the last 7 days     Assessment and plan     1. Irritable bowel syndrome with both constipation and diarrhea  - Gastrointestinal Panel, PCR - Stool, Per Rectum  - Clostridium Difficile Toxin, PCR - Stool, Per Rectum  - Fecal Lactoferrin - Stool, Per Rectum    2. Gastroesophageal reflux disease without esophagitis    3. Abdominal cramping  - Gastrointestinal Panel, PCR - Stool, Per Rectum  - Clostridium Difficile Toxin, PCR - Stool, Per Rectum  - Fecal Lactoferrin - Stool, Per Rectum    Not sure at this point if the worsening diarrhea is from the IUD side effects, excessive NSAID use due to the pain from the heavy periods right now  or even a side effect from her new migraine injections.  Will check stool studies just to make sure were not missing an infectious process.  For now I want her to continue the Levsin as needed.  Start Pepto-Bismol OTC or Imodium OTC as directed.  GERD seems well controlled on current medications.  Continue GERD precautions.  Patient to call the office next week with an update.  Patient to follow-up with me in 3 weeks.  Patient is agreeable to the plan.

## 2021-03-29 ENCOUNTER — HOSPITAL ENCOUNTER (OUTPATIENT)
Dept: PHYSICAL THERAPY | Facility: HOSPITAL | Age: 26
Setting detail: THERAPIES SERIES
Discharge: HOME | End: 2021-03-29

## 2021-03-29 DIAGNOSIS — G44.86 CERVICOGENIC HEADACHE: ICD-10-CM

## 2021-03-29 DIAGNOSIS — M54.2 CERVICAL PAIN: Primary | ICD-10-CM

## 2021-03-29 PROCEDURE — 97140 MANUAL THERAPY 1/> REGIONS: CPT

## 2021-03-29 PROCEDURE — 97110 THERAPEUTIC EXERCISES: CPT

## 2021-03-29 NOTE — THERAPY DISCHARGE NOTE
Outpatient Physical Therapy Ortho Progress Note/Discharge Summary  [facility]     Patient Name: Dior Curry  : 1995  MRN: [id number]  Today's Date: 3/29/2021      Visit Date: 2021    Visit Dx:    ICD-10-CM ICD-9-CM   1. Cervical pain  M54.2 723.1   2. Cervicogenic headache  R51.9 784.0       Patient Active Problem List   Diagnosis   • Drug screening, pre-employment   • Irritable bowel syndrome with both constipation and diarrhea   • Abdominal cramping   • Family history of ulcerative colitis   • Moderate persistent asthma without complication   • Reactive hypoglycemia   • Gastroesophageal reflux disease without esophagitis   • Anxiety and depression   • PCOS (polycystic ovarian syndrome)   • Abnormal uterine bleeding (AUB)   • Hypoglycemia   • Migraines   • Pelvic pain in female   • Class 3 severe obesity due to excess calories with body mass index (BMI) of 40.0 to 44.9 in adult (CMS/HCC)   • Chronic neck pain   • Chronic hand pain   • Hypovitaminosis D        Past Medical History:   Diagnosis Date   • Asthma    • Chronic headaches    • Depression    • Eczema    • Gastritis    • GERD (gastroesophageal reflux disease)    • Hemorrhoids    • History of bronchitis    • History of pneumonia    • IBS (irritable bowel syndrome)    • Migraine    • Migraines    • PCOS (polycystic ovarian syndrome)         Past Surgical History:   Procedure Laterality Date   • COLONOSCOPY  approx     inflammation per patient   • COLONOSCOPY N/A 2018    Granular mucosa in TI , NBIH   • UPPER GASTROINTESTINAL ENDOSCOPY  approx     slow transit per patient   • WISDOM TOOTH EXTRACTION         PT Ortho     Row Name 21 1700       Left Upper Ext    Lt Shoulder Flexion AROM  0-160 deg  -CA        Strength Right    # Reps  3  -CA    Right Rung  3  -CA    Right  Test 1  25  -CA    Right  Test 2  30  -CA    Right  Test 3  20  -CA     Strength Average Right  25  -CA        Strength Left     # Reps  3  -CA    Left Rung  3  -CA    Left  Test 1  20  -CA    Left  Test 2  13  -CA    Left  Test 3  18  -CA     Strength Average Left  17  -CA      User Key  (r) = Recorded By, (t) = Taken By, (c) = Cosigned By    Initials Name Provider Type    Jessica Gordillo, PT Physical Therapist                      PT Assessment/Plan     Row Name 03/29/21 1700          PT Assessment    Assessment Comments  Dior Curry was seen for 7 physical therapy sessions for neck and hand pain.  Treatment included therapeutic exercise, manual therapy and patient education with home exercise program . She met all STG and 4/5 LTG. Progress to physical therapy goals was excellent.  She was discharged to an independent [facility] and provided patient education to self-manage condition.   -CA        PT Plan    PT Plan Comments  D/C PT  -CA       User Key  (r) = Recorded By, (t) = Taken By, (c) = Cosigned By    Initials Name Provider Type    Jessica Gordillo, PT Physical Therapist              OP Exercises     Row Name 03/29/21 1700             Subjective Comments    Subjective Comments  Feeling so much better than beginning. I am ready for dc today  -CA         Subjective Pain    Able to rate subjective pain?  yes  -CA      Pre-Treatment Pain Level  0  -CA         Total Minutes    70558 - PT Therapeutic Exercise Minutes  20 including education for HEP  -CA      27654 - PT Manual Therapy Minutes  10  -CA         Exercise 1    Exercise Name 1  supine chin tuck  -CA      Cueing 1  Verbal;Tactile  -CA      Reps 1  10  -CA      Time 1  5s  -CA         Exercise 3    Exercise Name 3  levator scap  -CA      Cueing 3  Verbal;Demo  -CA      Reps 3  3  -CA      Time 3  20s  -CA      Additional Comments  arm behind back  -CA         Exercise 4    Exercise Name 4  wall wash flex for tspine ext  -CA      Cueing 4  Verbal  -CA      Reps 4  10  -CA      Time 4  5s  -CA         Exercise 9    Exercise Name 9  row  -CA      Cueing 9   Verbal  -CA      Sets 9  2  -CA      Reps 9  10  -CA      Time 9  RTB  -CA         Exercise 11    Exercise Name 11  tband shldr ext  -CA      Cueing 11  Verbal  -CA      Sets 11  2  -CA      Reps 11  10  -CA      Time 11  RTB  -CA         Exercise 12    Exercise Name 12  wall push up plus  -CA      Cueing 12  Verbal  -CA      Reps 12  2x10  -CA        User Key  (r) = Recorded By, (t) = Taken By, (c) = Cosigned By    Initials Name Provider Type    Jessica Gordillo, PT Physical Therapist                        Manual Rx (last 36 hours)      Manual Treatments     Row Name 03/29/21 1700             Total Minutes    55637 - PT Manual Therapy Minutes  10  -CA         Manual Rx 2    Manual Rx 2 Location  supine cx PA glides/sideglides grade II-III  -CA      Manual Rx 2 Type  gentle STM LS, UT, pecs  -CA        User Key  (r) = Recorded By, (t) = Taken By, (c) = Cosigned By    Initials Name Provider Type    Jessica Gordillo, PT Physical Therapist          PT OP Goals     Row Name 03/29/21 1700          PT Short Term Goals    STG Date to Achieve  03/18/21  -CA     STG 1  The pt will report at least 40% reduction in frequency of R hand numbness using appropriate work and posture modifications.  -CA     STG 1 Progress  Met  -CA     STG 1 Progress Comments  Still has had pain but hasn't had numbness for a few weeks  -CA        Long Term Goals    LTG Date to Achieve  04/26/21  -CA     LTG 1  The pt will demonstrate IND and compliant with HEP focused on return to PLOF and IND condition management.  -CA     LTG 1 Progress  Met  -CA     LTG 2  The pt will score less than 20% disability on the NDI to indicate improved perceived performance of ADLs.  -CA     LTG 2 Progress  Met  -CA     LTG 2 Progress Comments  8% disability  -CA     LTG 3  The pt will demonstrate L shoulder flexion AROM to at least 0-140 to facilitate improved functional reach  -CA     LTG 3 Progress  Met  -CA     LTG 3 Progress Comments  165 deg AROM l  shoulder flex  -CA     LTG 4  The pt will demonstrate B  strength to at least 40 pounds (average of 3 trials) to facilitate improved self care and recreational activity performance.  -CA     LTG 4 Progress  Not Met  -CA     LTG 4 Progress Comments  going to be seeing hand specialist  -CA     LTG 5  The pt will report no more than 6 total HA days per month to facilitate improved QOL and functional activity tolerance.  -CA     LTG 5 Progress  Met  -CA       User Key  (r) = Recorded By, (t) = Taken By, (c) = Cosigned By    Initials Name Provider Type    Jessica Gordillo, PT Physical Therapist          Therapy Education  Education Details: [id number]  Given: HEP  Program: Reinforced, Progressed  How Provided: Verbal, Demonstration, Written  Provided to: Patient  Level of Understanding: Teach back education performed, Verbalized, Demonstrated    Outcome Measure Options: Neck Disability Index (NDI)  Neck Disability Index  Section 1 - Pain Intensity: I have no pain at the moment.  Section 2 - Personal Care: I can look after myself normally without causing extra pain.  Section 3 - Lifting: I can lift heavy weights without causing extra pain  Section 4 - Work: I can do as much work as I want.  Section 5 - Headaches: I have slight headaches that come infrequently.  Section 6 - Concentration: I can concentrate fully without difficulty.  Section 7 - Sleeping: I have no trouble sleeping.  Section 8 - Driving: I can drive as long as I want with slight neck pain.  Section 9 - Reading: I can read as much as I want with slight neck pain.  Section 10 - Recreation: I have some neck pain with all recreational activities.  Neck Disability Index Score: 4  Neck Disability Index Comments: 8% disability      Time Calculation:   Start Time: 1705  Stop Time: 1735  Time Calculation (min): 30 min  Total Timed Code Minutes- PT: 30 minute(s)  Therapy Charges for Today     Code Description Service Date Service Provider Modifiers Qty     [id number]  PT THER PROC EA 15 MIN 3/29/2021 Jessica Baker, PT GP 1    [id number] HC PT MANUAL THERAPY EA 15 MIN 3/29/2021 Jessica Baker, PT GP 1          PT G-Codes  Outcome Measure Options: Neck Disability Index (NDI)  Neck Disability Index Score: 4     OP PT Discharge Summary  Date of Discharge: 03/29/21  Reason for Discharge: Independent, Maximum functional potential achieved  Outcomes Achieved: Refer to plan of care for updates on goals achieved  Discharge Destination: Home with home program  Discharge Instructions/Additional Comments: Cont HEP 2-3x per week, if s/s remained resolved may discontinue. F/u with hand specialist referral as scheduled.      Jessica Baker, PT  3/29/2021

## 2021-03-31 ENCOUNTER — APPOINTMENT (OUTPATIENT)
Dept: MRI IMAGING | Facility: HOSPITAL | Age: 26
End: 2021-03-31

## 2021-03-31 ENCOUNTER — APPOINTMENT (OUTPATIENT)
Dept: PHYSICAL THERAPY | Facility: HOSPITAL | Age: 26
End: 2021-03-31

## 2021-04-07 ENCOUNTER — TELEPHONE (OUTPATIENT)
Dept: GASTROENTEROLOGY | Facility: CLINIC | Age: 26
End: 2021-04-07

## 2021-04-07 NOTE — TELEPHONE ENCOUNTER
OK for note indicating that patient needs access to the restroom while at work; she also has paperwork from 12/2020 scanned in that we submitted for her.

## 2021-04-07 NOTE — TELEPHONE ENCOUNTER
Letter faxed to number below and confirmation received.     Called pt and advised of the above. Verb understanding

## 2021-04-07 NOTE — TELEPHONE ENCOUNTER
----- Message from Dior Curry sent at 4/6/2021 11:23 AM EDT -----  Regarding: Visit Follow-Up Question  Contact: [phone]  Hello,    My HR at work is requesting a note from my last visit stating that my IBS flare up has caused interruptions with my work due to having to use the bathroom more frequently. If you could please fax the note to [phone] ATTN: Dacia Awan.    Thank you!

## 2021-04-07 NOTE — TELEPHONE ENCOUNTER
Called pt and advised of Dr Curry note. She verb understanding . She states that her employer is requiring her to have the letter in addition to the other paper work . She states it needs to contain that she may have frequent access to the bathroom and this in turn may interfere with her work flow.      Advised pt we will type up letter and fax to her employer.  Verb understanding.     Letter written and faxed to pt's employer.  Fax did not go thru .     Called pt and verified fax number. Pt confirmed number. ADvised pt will attempt again in one hour.  She states if it does not go thru , she will come pick letter up.

## 2021-04-16 ENCOUNTER — BULK ORDERING (OUTPATIENT)
Dept: CASE MANAGEMENT | Facility: OTHER | Age: 26
End: 2021-04-16

## 2021-04-16 DIAGNOSIS — Z23 IMMUNIZATION DUE: ICD-10-CM

## 2021-05-17 DIAGNOSIS — E55.9 HYPOVITAMINOSIS D: ICD-10-CM

## 2021-05-17 RX ORDER — ERGOCALCIFEROL 1.25 MG/1
CAPSULE ORAL
Qty: 8 CAPSULE | Refills: 2 | Status: SHIPPED | OUTPATIENT
Start: 2021-05-17 | End: 2022-10-12

## 2021-06-12 NOTE — ADDENDUM NOTE
Addended by: NABILA TORO on: 2/16/2021 11:06 AM     Modules accepted: Orders     Summary of Your Rheumatology Visit    Next Appointment:  3 Months       Medications:      Referrals:        Tests:     Recommend pursuing lab orders.      Injections:        Other:

## 2021-07-20 RX ORDER — PROMETHAZINE HYDROCHLORIDE 12.5 MG/1
TABLET ORAL
Qty: 20 TABLET | Refills: 1 | Status: SHIPPED | OUTPATIENT
Start: 2021-07-20

## 2021-10-18 NOTE — TELEPHONE ENCOUNTER
Esribe requests received for prevacid and famotidine.     See o/v notes of 3/26/21.  Pt cancelled f/u appt.     Message to ALLI Lara.

## 2021-10-19 RX ORDER — LANSOPRAZOLE 30 MG/1
CAPSULE, DELAYED RELEASE ORAL
Qty: 60 CAPSULE | Refills: 2 | Status: SHIPPED | OUTPATIENT
Start: 2021-10-19 | End: 2022-12-20 | Stop reason: SDUPTHER

## 2021-10-19 RX ORDER — FAMOTIDINE 20 MG/1
TABLET, FILM COATED ORAL
Qty: 60 TABLET | Refills: 11 | Status: SHIPPED | OUTPATIENT
Start: 2021-10-19 | End: 2022-12-20 | Stop reason: SDUPTHER

## 2022-10-11 DIAGNOSIS — Z86.69 HISTORY OF MIGRAINE: ICD-10-CM

## 2022-10-11 DIAGNOSIS — E55.9 HYPOVITAMINOSIS D: ICD-10-CM

## 2022-10-12 ENCOUNTER — TELEPHONE (OUTPATIENT)
Dept: GASTROENTEROLOGY | Facility: CLINIC | Age: 27
End: 2022-10-12

## 2022-10-12 RX ORDER — ERGOCALCIFEROL 1.25 MG/1
CAPSULE ORAL
Qty: 4 CAPSULE | Refills: 0 | Status: SHIPPED | OUTPATIENT
Start: 2022-10-12

## 2022-10-12 RX ORDER — PROMETHAZINE HYDROCHLORIDE 12.5 MG/1
TABLET ORAL
Qty: 20 TABLET | Refills: 1 | OUTPATIENT
Start: 2022-10-12

## 2022-10-12 RX ORDER — NORTRIPTYLINE HYDROCHLORIDE 10 MG/1
CAPSULE ORAL
Qty: 30 CAPSULE | Refills: 0 | Status: SHIPPED | OUTPATIENT
Start: 2022-10-12 | End: 2023-04-05

## 2022-10-12 NOTE — TELEPHONE ENCOUNTER
A request for lansoprazole pa has come in. Patient has not been seen since 3/26/2021. Patient will need to make an appointment in order for us to do the pa.

## 2022-11-07 RX ORDER — FAMOTIDINE 20 MG/1
TABLET, FILM COATED ORAL
Qty: 60 TABLET | Refills: 11 | OUTPATIENT
Start: 2022-11-07

## 2022-11-07 RX ORDER — LANSOPRAZOLE 30 MG/1
CAPSULE, DELAYED RELEASE ORAL
Qty: 60 CAPSULE | Refills: 2 | OUTPATIENT
Start: 2022-11-07

## 2022-12-12 RX ORDER — FAMOTIDINE 20 MG/1
TABLET, FILM COATED ORAL
Qty: 60 TABLET | Refills: 11 | OUTPATIENT
Start: 2022-12-12

## 2022-12-12 RX ORDER — LANSOPRAZOLE 30 MG/1
CAPSULE, DELAYED RELEASE ORAL
Qty: 60 CAPSULE | Refills: 2 | OUTPATIENT
Start: 2022-12-12

## 2022-12-20 ENCOUNTER — TELEPHONE (OUTPATIENT)
Dept: GASTROENTEROLOGY | Facility: CLINIC | Age: 27
End: 2022-12-20

## 2022-12-20 RX ORDER — FAMOTIDINE 20 MG/1
20 TABLET, FILM COATED ORAL 2 TIMES DAILY
Qty: 60 TABLET | Refills: 11 | Status: SHIPPED | OUTPATIENT
Start: 2022-12-20 | End: 2022-12-29 | Stop reason: SDUPTHER

## 2022-12-20 RX ORDER — LANSOPRAZOLE 30 MG/1
30 CAPSULE, DELAYED RELEASE ORAL 2 TIMES DAILY
Qty: 60 CAPSULE | Refills: 11 | Status: SHIPPED | OUTPATIENT
Start: 2022-12-20 | End: 2022-12-29 | Stop reason: SDUPTHER

## 2022-12-20 NOTE — TELEPHONE ENCOUNTER
Caller: Dior Curry    Relationship: Self    Best call back number: [phone]    Requested Prescriptions: lansoprazole (PREVACID) 30 MG capsule   famotidine (PEPCID) 20 MG tablet     LEVSIN    Requested Prescriptions      No prescriptions requested or ordered in this encounter        Pharmacy where request should be sent:     [facility] [id number] - [place], [place] - [address] - [phone]  - [phone] FX   [address] [facility] [address]   Phone: [phone] Fax: [phone]         Additional details provided by patient: PT HAS SCHEDULED APPT TO SEE EDGARDO ALBARADO ON 12.29.22 @ 3:15PM    Does the patient have less than a 3 day supply:  [x] Yes  [] No    Would you like a call back once the refill request has been completed: [x] Yes [] No    If the office needs to give you a call back, can they leave a voicemail: [] Yes [x] No    Israel Wright Rep   12/20/22 15:35 EST

## 2022-12-29 ENCOUNTER — OFFICE VISIT (OUTPATIENT)
Dept: GASTROENTEROLOGY | Facility: CLINIC | Age: 27
End: 2022-12-29

## 2022-12-29 VITALS
HEART RATE: 99 BPM | SYSTOLIC BLOOD PRESSURE: 138 MMHG | DIASTOLIC BLOOD PRESSURE: 82 MMHG | BODY MASS INDEX: 45.82 KG/M2 | TEMPERATURE: 97.3 F | HEIGHT: 62 IN | WEIGHT: 249 LBS

## 2022-12-29 DIAGNOSIS — R10.9 ABDOMINAL CRAMPING: ICD-10-CM

## 2022-12-29 DIAGNOSIS — K21.9 GASTROESOPHAGEAL REFLUX DISEASE WITHOUT ESOPHAGITIS: Primary | Chronic | ICD-10-CM

## 2022-12-29 DIAGNOSIS — K58.2 IRRITABLE BOWEL SYNDROME WITH BOTH CONSTIPATION AND DIARRHEA: ICD-10-CM

## 2022-12-29 DIAGNOSIS — R10.30 LOWER ABDOMINAL PAIN: ICD-10-CM

## 2022-12-29 DIAGNOSIS — K76.0 FATTY LIVER: ICD-10-CM

## 2022-12-29 PROCEDURE — 99214 OFFICE O/P EST MOD 30 MIN: CPT | Performed by: NURSE PRACTITIONER

## 2022-12-29 RX ORDER — HYOSCYAMINE SULFATE 0.125 MG
0.12 TABLET ORAL EVERY 6 HOURS PRN
Qty: 90 TABLET | Refills: 3 | Status: SHIPPED | OUTPATIENT
Start: 2022-12-29

## 2022-12-29 RX ORDER — LANSOPRAZOLE 30 MG/1
30 CAPSULE, DELAYED RELEASE ORAL 2 TIMES DAILY
Qty: 180 CAPSULE | Refills: 3 | Status: SHIPPED | OUTPATIENT
Start: 2022-12-29

## 2022-12-29 RX ORDER — FAMOTIDINE 40 MG/1
40 TABLET, FILM COATED ORAL 2 TIMES DAILY
Qty: 180 TABLET | Refills: 3 | Status: SHIPPED | OUTPATIENT
Start: 2022-12-29

## 2022-12-29 NOTE — PROGRESS NOTES
Chief Complaint   Patient presents with   • Irritable Bowel Syndrome       Dior Curry is a  27 y.o. female here for a follow up visit for IBS.    HPI  27-year-old female presents today for follow-up visit for IBS-mixed.  She is a patient of Dr. Curry.  She was last seen in the office by me on 3/26/2021.  She has a history of IBS-mixed and admits normally she does really well on Levsin as needed.  She tells me lately she has been under a lot of stress.  She had the flu over Thanksgiving and all of the medications for the flu that she was prescribed caused her IBS to flareup.  Also to she admits she has been under more stress because her mom is now in the hospital with a pulmonary embolus.  She tells me so now she takes Levsin more frequently.  It does help though when she takes it it just seems to take longer than normal.  She does have a history of lactose intolerance.  She also has a history of GERD and admits she does well if she takes Prevacid 30 mg twice a day and Pepcid 40 mg twice a day.  She tells me she had to increase the Pepcid lately.  She also believes that is because of stress 2.  She denies any dysphagia, nausea and vomiting, rectal bleeding or melena.  She admits her appetite is good and her weight is stable.  Her last EGD was in 2015.  Her last colonoscopy was in 2018.  She had a gallbladder ultrasound done in the past that was positive for fatty liver disease.  Most recent LFTs in 2020 were normal.   She does have a GI family history of a mother with ulcerative colitis.  Past Medical History:   Diagnosis Date   • Asthma    • Chronic headaches    • Depression    • Eczema    • Gastritis    • GERD (gastroesophageal reflux disease)    • Hemorrhoids    • History of bronchitis    • History of pneumonia    • IBS (irritable bowel syndrome)    • Lactose intolerance    • Migraine    • Migraines    • PCOS (polycystic ovarian syndrome)        Past Surgical History:   Procedure Laterality Date   •  COLONOSCOPY  approx 2010    inflammation per patient   • COLONOSCOPY N/A 11/7/2018    Granular mucosa in TI , NBIH   • UPPER GASTROINTESTINAL ENDOSCOPY  approx 2015    slow transit per patient   • WISDOM TOOTH EXTRACTION         Scheduled Meds:    Continuous Infusions:No current facility-administered medications for this visit.      PRN Meds:.    No Known Allergies    Social History     Socioeconomic History   • Marital status: Significant Other   Tobacco Use   • Smoking status: Never   • Smokeless tobacco: Never   Vaping Use   • Vaping Use: Never used   Substance and Sexual Activity   • Alcohol use: Not Currently     Comment: very seldom   • Drug use: No   • Sexual activity: Yes     Partners: Male     Birth control/protection: Pill, OCP       Family History   Problem Relation Age of Onset   • Ulcerative colitis Mother    • Fibromyalgia Mother    • Rheum arthritis Mother    • Hypertension Mother    • Diabetes type II Father    • Hypertension Father    • Alcohol abuse Maternal Grandfather    • Alcohol abuse Brother    • Malig Hyperthermia Neg Hx        Review of Systems   Constitutional: Negative for appetite change, chills, diaphoresis, fatigue, fever and unexpected weight change.   HENT: Negative for nosebleeds, postnasal drip, sore throat, trouble swallowing and voice change.    Respiratory: Negative for cough, choking, chest tightness, shortness of breath, wheezing and stridor.    Cardiovascular: Negative for chest pain, palpitations and leg swelling.   Gastrointestinal: Positive for abdominal distention, abdominal pain and diarrhea. Negative for anal bleeding, blood in stool, constipation, nausea, rectal pain and vomiting.   Endocrine: Negative for polydipsia, polyphagia and polyuria.   Musculoskeletal: Negative for gait problem.   Skin: Negative for rash and wound.   Allergic/Immunologic: Negative for food allergies.   Neurological: Negative for dizziness, speech difficulty and light-headedness.    Psychiatric/Behavioral: Negative for confusion, self-injury, sleep disturbance and suicidal ideas.       Vitals:    12/29/22 1531   BP: 138/82   Pulse: 99   Temp: 97.3 °F (36.3 °C)       Physical Exam  Constitutional:       General: She is not in acute distress.     Appearance: She is well-developed. She is not ill-appearing.   HENT:      Head: Normocephalic.   Eyes:      Pupils: Pupils are equal, round, and reactive to light.   Cardiovascular:      Rate and Rhythm: Normal rate and regular rhythm.      Heart sounds: Normal heart sounds.   Pulmonary:      Effort: Pulmonary effort is normal.      Breath sounds: Normal breath sounds.   Abdominal:      General: Bowel sounds are normal. There is no distension.      Palpations: Abdomen is soft. There is no mass.      Tenderness: There is no abdominal tenderness. There is no guarding or rebound.      Hernia: No hernia is present.   Musculoskeletal:         General: Normal range of motion.   Skin:     General: Skin is warm and dry.   Neurological:      Mental Status: She is alert and oriented to person, place, and time.   Psychiatric:         Speech: Speech normal.         Behavior: Behavior normal.         Judgment: Judgment normal.         No radiology results for the last 7 days     Diagnoses and all orders for this visit:    1. Gastroesophageal reflux disease without esophagitis (Primary)    2. Irritable bowel syndrome with both constipation and diarrhea  -     hyoscyamine (ANASPAZ,LEVSIN) 0.125 MG tablet; Take 1 tablet by mouth Every 6 (Six) Hours As Needed for Cramping or Diarrhea.  Dispense: 90 tablet; Refill: 3    3. Lower abdominal pain    4. Fatty liver  -     Comprehensive Metabolic Panel    5. Abdominal cramping  -     hyoscyamine (ANASPAZ,LEVSIN) 0.125 MG tablet; Take 1 tablet by mouth Every 6 (Six) Hours As Needed for Cramping or Diarrhea.  Dispense: 90 tablet; Refill: 3    Other orders  -     famotidine (PEPCID) 40 MG tablet; Take 1 tablet by mouth 2 (Two)  Times a Day.  Dispense: 180 tablet; Refill: 3  -     lansoprazole (PREVACID) 30 MG capsule; Take 1 capsule by mouth 2 (Two) Times a Day.  Dispense: 180 capsule; Refill: 3    Reviewed most recent lab work with her today.  Most recent LFTs were normal in 2020.  We will check CMP today.  GERD sounds better when she takes Prevacid 30 mg twice daily and Pepcid 40 mg twice daily.  I will refill both of those today.  Continue GERD precautions.  IBS-mixed seems pretty good right now when she takes her Levsin.  She is having to take it more frequently lately with all the stress she is under currently.  Continue bland diet.  Continue dairy free diet.  Patient to call the office with any issues.  Patient to follow-up with me in 1 year.  Patient is agreeable to the plan.

## 2022-12-30 ENCOUNTER — TELEPHONE (OUTPATIENT)
Dept: GASTROENTEROLOGY | Facility: CLINIC | Age: 27
End: 2022-12-30

## 2022-12-30 LAB
ALBUMIN SERPL-MCNC: 4.4 G/DL (ref 3.5–5.2)
ALBUMIN/GLOB SERPL: 1.8 G/DL
ALP SERPL-CCNC: 78 U/L (ref 39–117)
ALT SERPL-CCNC: 21 U/L (ref 1–33)
AST SERPL-CCNC: 15 U/L (ref 1–32)
BILIRUB SERPL-MCNC: 0.3 MG/DL (ref 0–1.2)
BUN SERPL-MCNC: 11 MG/DL (ref 6–20)
BUN/CREAT SERPL: 14.7 (ref 7–25)
CALCIUM SERPL-MCNC: 9.3 MG/DL (ref 8.6–10.5)
CHLORIDE SERPL-SCNC: 105 MMOL/L (ref 98–107)
CO2 SERPL-SCNC: 23.7 MMOL/L (ref 22–29)
CREAT SERPL-MCNC: 0.75 MG/DL (ref 0.57–1)
EGFRCR SERPLBLD CKD-EPI 2021: 112.1 ML/MIN/1.73
GLOBULIN SER CALC-MCNC: 2.5 GM/DL
GLUCOSE SERPL-MCNC: 114 MG/DL (ref 65–99)
POTASSIUM SERPL-SCNC: 3.8 MMOL/L (ref 3.5–5.2)
PROT SERPL-MCNC: 6.9 G/DL (ref 6–8.5)
SODIUM SERPL-SCNC: 139 MMOL/L (ref 136–145)

## 2022-12-30 NOTE — TELEPHONE ENCOUNTER
PA request along w/ office visits & path sent to [phone] fax  Confirmation recvd    1/5/2023  PA denied- to be scanned into [employer]

## 2022-12-30 NOTE — TELEPHONE ENCOUNTER
----- Message from EDGARDO Vo sent at 12/30/2022 10:56 AM EST -----  Please call the patient and let her know her labs look good.  Thanks

## 2023-02-10 ENCOUNTER — TELEPHONE (OUTPATIENT)
Dept: GASTROENTEROLOGY | Facility: CLINIC | Age: 28
End: 2023-02-10

## 2023-02-10 NOTE — TELEPHONE ENCOUNTER
Called pt to reschedule her 12/29 appt that she had with Shanita Lara since she is leaving the office.  I have her scheduled for 12/20 at 3:45

## 2023-04-04 NOTE — PROGRESS NOTES
Mina Moon M.D.  Internal Medicine  [facility]  [address], [address]  [place] [address]  [phone]      Chief Complaint  Establish Care, Anxiety, Asthma, Depression, and migrainess     SUBJECTIVE    History of Present Illness    Dior Curry is a 28 y.o. female who presents to the office today as a new patient to establish care.     Depression/anxiety-States she has been shaniqua more stress the past few months and feels more depression and anxiety. She was hospitalized for 2 weeks at age 15. She has done group, individual therapy and medication in the past. Medications made her feel numb in the past. Usually sy,ptoms controlled with therapy. She thinks she wass on zoloft and venlafaxine which did not work. She is sleeping poorly and having trouble going to sleep and shutting er brain off.     Migraines-takes nortriptyline but needs refill. Usually has 1 every 6 months. Now has 3 headaches/month but more often prolonged headaches for a week at a time.    She is requesting a referral to dermatology for frequent rashes from sun exposure.    IBS-mixed-follows with Dr. Curry.  On high hycosamine.    GERD-takes Pepcid and Prevacid    Asthma-more trouble catching her breath since having COVID in Jan. Using albuterol 1-2 times/week. No nocturnal symptoms.     Fatty liver disease    She has PCOS-she has normal monthly periods for 4-5 days.     Social-works at [employer] in [employer]    Review of Systems    No Known Allergies     Outpatient Medications Marked as Taking for the 4/5/23 encounter (Office Visit) with Mina Moon MD   Medication Sig Dispense Refill   • albuterol sulfate  (90 Base) MCG/ACT inhaler Inhale 2 puffs.     • cetirizine (zyrTEC) 10 MG tablet Take 1 tablet by mouth Daily.     • EPINEPHrine (EPIPEN 2-ETIENNE IJ) Inject  as directed As Needed.     • famotidine (PEPCID) 40 MG tablet Take 1 tablet by mouth 2 (Two) Times a Day. 180 tablet 3   • FIBER ADULT GUMMIES  PO Take  by mouth.     • fluticasone (FLONASE) 50 MCG/ACT nasal spray 2 sprays into the nostril(s) as directed by provider Daily.     • hyoscyamine (ANASPAZ,LEVSIN) 0.125 MG tablet Take 1 tablet by mouth Every 6 (Six) Hours As Needed for Cramping or Diarrhea. 90 tablet 3   • lansoprazole (PREVACID) 30 MG capsule Take 1 capsule by mouth 2 (Two) Times a Day. 180 capsule 3   • methylcellulose, Laxative, (CITRUCEL) 500 MG tablet tablet Take 2 tablets by mouth 2 (Two) Times a Day With Meals. 120 tablet 1   • montelukast (SINGULAIR) 10 MG tablet Take 1 tablet by mouth Daily.     • nortriptyline (PAMELOR) 25 MG capsule Take 1 capsule by mouth Every Night for 14 days, THEN 2 capsules Every Night for 14 days. 30 capsule 2   • promethazine (PHENERGAN) 12.5 MG tablet TAKE ONE TABLET BY MOUTH EVERY 6 HOURS AS NEEDED FOR NAUSEA AND VOMITING 20 tablet 1   • vitamin B-12 (CYANOCOBALAMIN) 500 MCG tablet Take 1 tablet by mouth Daily.     • vitamin D (ERGOCALCIFEROL) 1.25 MG (36132 UT) capsule capsule TAKE ONE CAPSULE BY MOUTH ONCE WEEKLY 4 capsule 0   • [DISCONTINUED] nortriptyline (PAMELOR) 10 MG capsule TAKE ONE CAPSULE BY MOUTH ONCE NIGHTLY 30 capsule 0        Past Medical History:   Diagnosis Date   • Asthma    • Chronic headaches    • Depression    • Eczema    • Gastritis    • GERD (gastroesophageal reflux disease)    • Hemorrhoids    • History of bronchitis    • History of pneumonia    • IBS (irritable bowel syndrome)    • Lactose intolerance    • Migraine    • Migraines    • PCOS (polycystic ovarian syndrome)      Past Surgical History:   Procedure Laterality Date   • COLONOSCOPY  approx 2010    inflammation per patient   • COLONOSCOPY N/A 11/7/2018    Granular mucosa in TI , NBIH   • UPPER GASTROINTESTINAL ENDOSCOPY  approx 2015    slow transit per patient   • WISDOM TOOTH EXTRACTION       Family History   Problem Relation Age of Onset   • Ulcerative colitis Mother    • Fibromyalgia Mother    • Rheum arthritis Mother    •  "Hypertension Mother    • Clotting disorder Mother    • Uterine cancer Mother    • Diabetes type II Father    • Hypertension Father    • Alcohol abuse Brother    • Alcohol abuse Maternal Grandfather    • Malig Hyperthermia Neg Hx     reports that she has never smoked. She has never used smokeless tobacco. She reports that she does not currently use alcohol. She reports that she does not use drugs.    OBJECTIVE    Vital Signs:   /67   Pulse 98   Temp 98.2 °F (36.8 °C)   Ht 157.5 cm (62.01\")   Wt 111 kg (244 lb)   SpO2 97%   BMI 44.62 kg/m²     Physical Exam  Constitutional:       Appearance: Normal appearance. She is normal weight.   Cardiovascular:      Rate and Rhythm: Normal rate and regular rhythm.      Heart sounds: Normal heart sounds. No murmur heard.  Pulmonary:      Effort: Pulmonary effort is normal.      Breath sounds: Normal breath sounds.   Abdominal:      General: Abdomen is flat. There is no distension.      Palpations: Abdomen is soft.      Tenderness: There is no abdominal tenderness.   Skin:     General: Skin is warm and dry.   Neurological:      Mental Status: She is alert.   Psychiatric:         Mood and Affect: Mood normal.         Behavior: Behavior normal.         Thought Content: Thought content normal.        She has photos of her skin on her phone that show maculopapular rash on dorsum of hand, chest.    The following data was reviewed by: Mina Moon MD on 04/05/2023:  Common labs    Common Labs 12/29/22   Glucose 114 (A)   BUN 11   Creatinine 0.75   Sodium 139   Potassium 3.8   Chloride 105   Calcium 9.3   Total Protein 6.9   Albumin 4.4   Total Bilirubin 0.3   Alkaline Phosphatase 78   AST (SGOT) 15   ALT (SGPT) 21   (A) Abnormal value            Data reviewed: GI notes             ASSESSMENT & PLAN     Diagnoses and all orders for this visit:    1. Moderate episode of recurrent major depressive disorder (Primary)  -     nortriptyline (PAMELOR) 25 MG capsule; Take 1 capsule by " mouth Every Night for 14 days, THEN 2 capsules Every Night for 14 days.  Dispense: 30 capsule; Refill: 2    2. Rash  -     Ambulatory Referral to Dermatology    3. History of migraine  -     nortriptyline (PAMELOR) 25 MG capsule; Take 1 capsule by mouth Every Night for 14 days, THEN 2 capsules Every Night for 14 days.  Dispense: 30 capsule; Refill: 2    4. Mild intermittent asthma without complication  -     Pneumococcal Conjugate Vaccine 20-Valent (PCV20)    Other orders  -     Discontinue: nortriptyline (PAMELOR) 25 MG capsule; Take 1 capsule by mouth Every Night.  Dispense: 30 capsule; Refill: 2      Patient with recurrence of depression/anxiety.  She is already on nortriptyline for migraines so we will increase this to see if this helps with sleep and mood.  Could consider augmenting with Wellbutrin or BuSpar if no improvement.    #Annual Preventative Health Examination   -Age and sex appropriate physical exam performed and documented. Updated past medical, family, social and surgical histories as well as allergies and care team list. Addressed care gaps listed in the medical record.  -Encouraged seat belt use for every car ride for patient and all occupants. Discussed securing of all guns in the home for patient and family protection. Encouraged sunscreen use to reduce risk of skin cancer for any days with sun exposure over 20 minutes. Recommended helmet if biking or riding motorcycle to prevent head trauma. Discussed the importance of smoke and carbon monoxide detectors in the home.   -Encouraged annual dental and vision exams as part of their overall health.  -Encouraged minimum of 30 minutes or more of exercise at a brisk walk or higher 5 days per week combined with a well-balanced diet.  -Immunizations reviewed and updated in EMR.  -Advised that all women who are planning or capable of pregnancy take a daily supplement containing 0.4 to 0.8 mg (400 to 800 ?g) of folic acid.  The ASCVD Risk score (Thad NATHALIA,  et al., 2019) failed to calculate for the following reasons:    The 2019 ASCVD risk score is only valid for ages 40 to 79   -Lipid screening:  Will screen for hyperlipidemia today and calculate ASCVD risk if appropriate.    -Aspirin for primary or secondary prevention: Not applicable, patient is less than age 50.  -Depression screening: PHQ2 performed and the patient's screen was positive.  -Diabetes screening:  Screening not indicated at this time.   -Tobacco use screening: Patient denies cigarette use. Tobacco counseling was was not indicated.  -Alcohol use screening: Patient denies alcohol consumption.. Alcohol abuse counseling was was not indicated.  -Illicit drug screening: Patient does not use illicit drugs.  -Hypertension screening: Patient screened negative for HTN today.  --Hepatitis C virus screening:  Patient has already completed Hepatitis C screening. Negative screening on file.   -Cervical cancer screening: Follows with GYN        Health Maintenance Due   Topic Date Due   • COVID-19 Vaccine (3 - Booster for Moderna series) 11/20/2021   • ANNUAL PHYSICAL  02/04/2022        Follow Up  Return in about 3 months (around 7/5/2023) for Recheck.    Patient/family had no further questions at this time and verbalized understanding of the plan discussed today.

## 2023-04-05 ENCOUNTER — OFFICE VISIT (OUTPATIENT)
Dept: INTERNAL MEDICINE | Facility: CLINIC | Age: 28
End: 2023-04-05
Payer: COMMERCIAL

## 2023-04-05 VITALS
WEIGHT: 244 LBS | SYSTOLIC BLOOD PRESSURE: 120 MMHG | OXYGEN SATURATION: 97 % | DIASTOLIC BLOOD PRESSURE: 67 MMHG | HEART RATE: 98 BPM | BODY MASS INDEX: 44.9 KG/M2 | HEIGHT: 62 IN | TEMPERATURE: 98.2 F

## 2023-04-05 DIAGNOSIS — R21 RASH: ICD-10-CM

## 2023-04-05 DIAGNOSIS — F33.1 MODERATE EPISODE OF RECURRENT MAJOR DEPRESSIVE DISORDER: Primary | ICD-10-CM

## 2023-04-05 DIAGNOSIS — Z86.69 HISTORY OF MIGRAINE: ICD-10-CM

## 2023-04-05 DIAGNOSIS — J45.20 MILD INTERMITTENT ASTHMA WITHOUT COMPLICATION: ICD-10-CM

## 2023-04-05 RX ORDER — NORTRIPTYLINE HYDROCHLORIDE 25 MG/1
CAPSULE ORAL
Qty: 30 CAPSULE | Refills: 2 | Status: SHIPPED | OUTPATIENT
Start: 2023-04-05 | End: 2023-05-03

## 2023-04-05 RX ORDER — NORTRIPTYLINE HYDROCHLORIDE 25 MG/1
25 CAPSULE ORAL NIGHTLY
Qty: 30 CAPSULE | Refills: 2 | Status: SHIPPED | OUTPATIENT
Start: 2023-04-05 | End: 2023-04-05

## 2023-04-07 ENCOUNTER — PATIENT ROUNDING (BHMG ONLY) (OUTPATIENT)
Dept: INTERNAL MEDICINE | Facility: CLINIC | Age: 28
End: 2023-04-07
Payer: COMMERCIAL

## 2023-04-07 NOTE — PROGRESS NOTES
April 7, 2023    Hello,     My name is     I am  with [facility]  [address] [address] [phone].    Before we get started may I verify your date of birth? 1995    I am calling to officially welcome you to our practice and ask about your recent visit. Is this a good time to talk?        We're always looking for ways to make our patients' experiences even better. Do you have recommendations on ways we may improve?      Overall were you satisfied with your first visit to our practice?        I appreciate you taking the time to speak with me today. Is there anything else I can do for you?       Thank you, and have a great day.

## 2023-08-25 DIAGNOSIS — F33.1 MODERATE EPISODE OF RECURRENT MAJOR DEPRESSIVE DISORDER: ICD-10-CM

## 2023-08-25 DIAGNOSIS — Z86.69 HISTORY OF MIGRAINE: ICD-10-CM

## 2023-08-28 RX ORDER — NORTRIPTYLINE HYDROCHLORIDE 25 MG/1
CAPSULE ORAL
Qty: 30 CAPSULE | Refills: 2 | Status: SHIPPED | OUTPATIENT
Start: 2023-08-28

## 2023-10-11 ENCOUNTER — TRANSCRIBE ORDERS (OUTPATIENT)
Dept: ADMINISTRATIVE | Facility: HOSPITAL | Age: 28
End: 2023-10-11
Payer: COMMERCIAL

## 2023-10-11 DIAGNOSIS — M54.2 TENDERNESS OF NECK: Primary | ICD-10-CM

## 2023-10-18 ENCOUNTER — HOSPITAL ENCOUNTER (OUTPATIENT)
Dept: ULTRASOUND IMAGING | Facility: HOSPITAL | Age: 28
Discharge: HOME | End: 2023-10-18
Admitting: NURSE PRACTITIONER
Payer: COMMERCIAL

## 2023-10-18 DIAGNOSIS — M54.2 TENDERNESS OF NECK: ICD-10-CM

## 2023-10-18 PROCEDURE — 76536 US EXAM OF HEAD AND NECK: CPT

## 2023-10-26 NOTE — ASSESSMENT & PLAN NOTE
EGD INSTRUCTIONS      Re: Homero Bernstein   [address] [facility] [address]     Provider: Yasmine Chapa MD     Your exam is scheduled as an outpatient procedure on:      Day: Tuesday     Date: January 16 2024     Arrival Time: 7:00 AM (You will receive a confirmation message 3 days before your appointment, if you do not receive a message or have questions, contact [phone] or visit the patient portal for details.). Be aware your procedure time is subject to change.)      You will be receiving sedation for your procedure and MUST have an adult over 18 to drive you home and be with you after procedure.      Location: [facility], [address] [place] [id number] - Directions: Come all the way into the main lobby of the building and take the interior elevator down to the lower level. Turn left off the elevator and walk straight ahead to the Endoscopy reception area.                           7 days before: Review your instructions. (Instructions can also be found on [employer] [facility] under the letters tab under communication)           3 days before:Stop taking all anti-inflammatory medicines. These include: Advil, Aleve, Naprosyn, (Tylenol is okay to take)     1 day before:   Eat normal diet throughout the day   At MIDNIGHT start a strict clear liquid diet    A clear liquid diet can include: Apple juice, white grape juice, and white cranberry juice; Beef or chicken broths that are clear - no noodles, vegetables, rice, etc.Tea and coffee without milk; -Soda pop, Gatorade, Luc-Aid and various -Jell-O Flavors (any color except red or purple)  Avoid: juices with pulp, milk, cream, solid food, alcohol, Gum, and hard candy.     -Remove ALL jewelry and piercings (rings, necklaces, all body piercings, etc) prior to arrival for procedure.        Take your medications; sertraline (ZOLOFT) 100 MG tablet,  amLODIPine (NORVASC) 2.5 MG tablet, ALPRAZolam (XANAX) 0.5 MG tablet, QUEtiapine  Continue 50,000 units of vitamin D weekly.  Vitamin D level with labs and we will adjust therapy if needed.   (SEROquel) 50 MG tablet, with a sip of water 4 hours prior to your arrival time. STOP ALL LIQUIDS INCLUDING WATER AT THIS TIME.                                         Detail Level: Detailed

## 2023-12-20 ENCOUNTER — OFFICE VISIT (OUTPATIENT)
Dept: GASTROENTEROLOGY | Facility: CLINIC | Age: 28
End: 2023-12-20
Payer: COMMERCIAL

## 2023-12-20 VITALS
HEIGHT: 62 IN | BODY MASS INDEX: 43.87 KG/M2 | DIASTOLIC BLOOD PRESSURE: 91 MMHG | HEART RATE: 110 BPM | OXYGEN SATURATION: 99 % | SYSTOLIC BLOOD PRESSURE: 136 MMHG | WEIGHT: 238.4 LBS | TEMPERATURE: 97.8 F

## 2023-12-20 DIAGNOSIS — K21.9 GASTROESOPHAGEAL REFLUX DISEASE WITHOUT ESOPHAGITIS: Primary | ICD-10-CM

## 2023-12-20 DIAGNOSIS — K76.0 FATTY LIVER: ICD-10-CM

## 2023-12-20 DIAGNOSIS — R10.9 ABDOMINAL CRAMPING: ICD-10-CM

## 2023-12-20 DIAGNOSIS — K58.2 IRRITABLE BOWEL SYNDROME WITH BOTH CONSTIPATION AND DIARRHEA: ICD-10-CM

## 2023-12-20 RX ORDER — LANSOPRAZOLE 30 MG/1
30 CAPSULE, DELAYED RELEASE ORAL 2 TIMES DAILY
Qty: 180 CAPSULE | Refills: 3 | Status: SHIPPED | OUTPATIENT
Start: 2023-12-20

## 2023-12-20 RX ORDER — ACETAMINOPHEN AND CODEINE PHOSPHATE 120; 12 MG/5ML; MG/5ML
0.35 SOLUTION ORAL DAILY
COMMUNITY
Start: 2023-08-01

## 2023-12-20 RX ORDER — PHENTERMINE HYDROCHLORIDE 37.5 MG/1
TABLET ORAL
COMMUNITY
Start: 2023-09-17

## 2023-12-20 RX ORDER — FAMOTIDINE 40 MG/1
40 TABLET, FILM COATED ORAL 2 TIMES DAILY
Qty: 180 TABLET | Refills: 3 | Status: SHIPPED | OUTPATIENT
Start: 2023-12-20

## 2023-12-20 RX ORDER — HYOSCYAMINE SULFATE 0.125 MG
0.12 TABLET ORAL EVERY 6 HOURS PRN
Qty: 90 TABLET | Refills: 3 | Status: SHIPPED | OUTPATIENT
Start: 2023-12-20

## 2023-12-20 NOTE — PROGRESS NOTES
"Chief Complaint  Gastroesophageal reflux disease without esophagitis    Subjective          History of Present Illness    Dior Curry is a  28 y.o. female presents for follow-up on GERD and IBS mixed.  She is a patient of Dr. Curry last seen on 12/29/2022.  She is new to me.    She takes Levsin as needed for her mixed IBS.  Stress makes her symptoms worse.  She takes Pamelor 25 mg nightly. Overall stable. Had some flares recently. Some harder stools with phentermine use.     She takes Prevacid 30 mg twice daily and Pepcid 40 mg twice daily for her GERD. She reports occasional flares. Avoids eating out.     She does have history of fatty liver disease noted on ultrasound.  BMI is 43.6.  She has lost 11 pounds since last year. She started phentermine for weight loss.    Last EGD in 2015.  Last colonoscopy in 2018.    She has a family history of ulcerative colitis in her mother.    Objective   Vital Signs:   /91   Pulse 110   Temp 97.8 °F (36.6 °C)   Ht 157.5 cm (62\")   Wt 108 kg (238 lb 6.4 oz)   SpO2 99%   BMI 43.60 kg/m²       Physical Exam  Vitals reviewed.   Constitutional:       General: She is awake. She is not in acute distress.     Appearance: Normal appearance. She is well-developed and well-groomed.   HENT:      Head: Normocephalic.   Pulmonary:      Effort: Pulmonary effort is normal. No respiratory distress.   Skin:     Coloration: Skin is not pale.   Neurological:      Mental Status: She is alert and oriented to person, place, and time.      Gait: Gait is intact.   Psychiatric:         Mood and Affect: Mood and affect normal.         Speech: Speech normal.         Behavior: Behavior is cooperative.         Judgment: Judgment normal.          Result Review :             Assessment and Plan    Diagnoses and all orders for this visit:    1. Gastroesophageal reflux disease without esophagitis (Primary)    2. Irritable bowel syndrome with both constipation and diarrhea  -     hyoscyamine " (ANASPAZ,LEVSIN) 0.125 MG tablet; Take 1 tablet by mouth Every 6 (Six) Hours As Needed for Cramping or Diarrhea.  Dispense: 90 tablet; Refill: 3    3. Fatty liver    4. BMI 40.0-44.9, adult    5. Abdominal cramping  -     hyoscyamine (ANASPAZ,LEVSIN) 0.125 MG tablet; Take 1 tablet by mouth Every 6 (Six) Hours As Needed for Cramping or Diarrhea.  Dispense: 90 tablet; Refill: 3    Other orders  -     lansoprazole (PREVACID) 30 MG capsule; Take 1 capsule by mouth 2 (Two) Times a Day.  Dispense: 180 capsule; Refill: 3  -     famotidine (PEPCID) 40 MG tablet; Take 1 tablet by mouth 2 (Two) Times a Day.  Dispense: 180 tablet; Refill: 3    Continue Prevacid and pepcid. Ideally would like to back her done on these medications. We reviewed GERD diet and lifestyle changes. Weight loss would likely help which she is working on. Discussed lifestyle changes to maintain weight loss over the long term.     Recommend MVI daily given high dose antiacid use.     Continue hyoscyamine as needed for pain, recommend she start fiber supplement, increase fiber in diet. Fiber handout given.     Follow Up   Return in about 1 year (around 12/20/2024).    Dragon dictation used throughout this note.     Jordyn Asif PA-C

## 2024-02-09 ENCOUNTER — TELEPHONE (OUTPATIENT)
Dept: GASTROENTEROLOGY | Facility: CLINIC | Age: 29
End: 2024-02-09
Payer: COMMERCIAL

## 2024-03-28 DIAGNOSIS — F33.1 MODERATE EPISODE OF RECURRENT MAJOR DEPRESSIVE DISORDER: ICD-10-CM

## 2024-03-28 DIAGNOSIS — Z86.69 HISTORY OF MIGRAINE: ICD-10-CM

## 2024-03-29 RX ORDER — NORTRIPTYLINE HYDROCHLORIDE 25 MG/1
CAPSULE ORAL
Qty: 30 CAPSULE | Refills: 2 | OUTPATIENT
Start: 2024-03-29

## 2024-05-09 RX ORDER — LANSOPRAZOLE 30 MG/1
30 CAPSULE, DELAYED RELEASE ORAL 2 TIMES DAILY
Qty: 180 CAPSULE | Refills: 1 | Status: SHIPPED | OUTPATIENT
Start: 2024-05-09

## 2024-11-06 RX ORDER — LANSOPRAZOLE 30 MG/1
30 CAPSULE, DELAYED RELEASE ORAL 2 TIMES DAILY
Qty: 180 CAPSULE | Refills: 0 | Status: SHIPPED | OUTPATIENT
Start: 2024-11-06

## 2025-01-31 RX ORDER — FAMOTIDINE 40 MG/1
40 TABLET, FILM COATED ORAL 2 TIMES DAILY
Qty: 180 TABLET | Refills: 3 | OUTPATIENT
Start: 2025-01-31

## 2025-02-10 RX ORDER — LANSOPRAZOLE 30 MG/1
30 CAPSULE, DELAYED RELEASE ORAL 2 TIMES DAILY
Qty: 180 CAPSULE | Refills: 0 | OUTPATIENT
Start: 2025-02-10

## (undated) DEVICE — TUBING, SUCTION, 1/4" X 10', STRAIGHT: Brand: MEDLINE

## (undated) DEVICE — THE TORRENT IRRIGATION SCOPE CONNECTOR IS USED WITH THE TORRENT IRRIGATION TUBING TO PROVIDE IRRIGATION FLUIDS SUCH AS STERILE WATER DURING GASTROINTESTINAL ENDOSCOPIC PROCEDURES WHEN USED IN CONJUNCTION WITH AN IRRIGATION PUMP (OR ELECTROSURGICAL UNIT).: Brand: TORRENT

## (undated) DEVICE — Device: Brand: DEFENDO AIR/WATER/SUCTION AND BIOPSY VALVE

## (undated) DEVICE — CANN NASL CO2 TRULINK W/O2 A/

## (undated) DEVICE — SINGLE-USE BIOPSY FORCEPS: Brand: RADIAL JAW 4